# Patient Record
Sex: FEMALE | Race: BLACK OR AFRICAN AMERICAN | Employment: FULL TIME | URBAN - METROPOLITAN AREA
[De-identification: names, ages, dates, MRNs, and addresses within clinical notes are randomized per-mention and may not be internally consistent; named-entity substitution may affect disease eponyms.]

---

## 2019-10-10 ENCOUNTER — OFFICE VISIT (OUTPATIENT)
Dept: PRIMARY CARE CLINIC | Age: 54
End: 2019-10-10

## 2019-10-10 ENCOUNTER — HOSPITAL ENCOUNTER (OUTPATIENT)
Dept: GENERAL RADIOLOGY | Age: 54
Discharge: HOME | End: 2019-10-10
Payer: COMMERCIAL

## 2019-10-10 ENCOUNTER — HOSPITAL ENCOUNTER (OUTPATIENT)
Dept: MAMMOGRAPHY | Age: 54
Discharge: HOME | End: 2019-10-10
Payer: COMMERCIAL

## 2019-10-10 VITALS
RESPIRATION RATE: 16 BRPM | SYSTOLIC BLOOD PRESSURE: 124 MMHG | HEIGHT: 63 IN | TEMPERATURE: 98.2 F | HEART RATE: 74 BPM | WEIGHT: 176 LBS | BODY MASS INDEX: 31.18 KG/M2 | OXYGEN SATURATION: 100 % | DIASTOLIC BLOOD PRESSURE: 82 MMHG

## 2019-10-10 DIAGNOSIS — R06.09 DOE (DYSPNEA ON EXERTION): ICD-10-CM

## 2019-10-10 DIAGNOSIS — M25.561 CHRONIC PAIN OF RIGHT KNEE: ICD-10-CM

## 2019-10-10 DIAGNOSIS — R25.1 TREMORS OF NERVOUS SYSTEM: ICD-10-CM

## 2019-10-10 DIAGNOSIS — G89.29 CHRONIC PAIN OF RIGHT KNEE: ICD-10-CM

## 2019-10-10 DIAGNOSIS — Z00.00 ANNUAL PHYSICAL EXAM: Primary | ICD-10-CM

## 2019-10-10 DIAGNOSIS — Z12.39 BREAST CANCER SCREENING: ICD-10-CM

## 2019-10-10 DIAGNOSIS — Z82.49 FAMILY HISTORY OF DVT: ICD-10-CM

## 2019-10-10 DIAGNOSIS — Z86.711 HISTORY OF PULMONARY EMBOLISM: ICD-10-CM

## 2019-10-10 PROCEDURE — 73562 X-RAY EXAM OF KNEE 3: CPT

## 2019-10-10 PROCEDURE — 77063 BREAST TOMOSYNTHESIS BI: CPT

## 2019-10-10 NOTE — PROGRESS NOTES
Chief Complaint   Patient presents with   [facility].    Other     discomfort,onset 6 mos ,     Tremors     right hand, onset 4 wks    Knee Pain     right , onset 4 mos     1. Have you been to the ER, urgent care clinic since your last visit? Hospitalized since your last visit? No    2. Have you seen or consulted any other health care providers outside of the [address] [place] [address] since your last visit? Include any pap smears or colon screening.  No

## 2019-10-10 NOTE — PROGRESS NOTES
Vince Mathews is a 48 y.o.  female and presents with     Chief Complaint   Patient presents with   [facility]    Other     discomfort,onset 6 mos ,     Tremors     right hand, onset 4 wks, denies any pain just discomfort    Knee Pain     right hand, onset over 6 mos    Referral Request     may need referral for Ortho     Pt is here to establish care,  Pt has pulling sensation behind rt  Knee for over 6 months. No injuries or falls, NO h/o gout. Pt works at [employer]. She also noticed tremors in rt hand . NO neck problems  The rt hand feels weak. NO h/o MS in the family. Pt denies eye symptoms. Pt also gives h/o RUBY and occasional burning sensation in chest. She wants to make sure it is not her heart. Father had stents placed in his heart. Brothers and aunts have h/o DVT. Pt gives h/o PE 4 years back and took Xarelto for 8 months and it was stopped as she has having menstrual bleeding. Pt says she was seieing Hematologist wo did blood work and told everything was okay. However pt did not have  A repeat CT chest to document resolution of PE. Pt does not smoke or drink alcohol. Past Medical History:   Diagnosis Date    Bronchitis      Past Surgical History:   Procedure Laterality Date    HX DILATION AND CURETTAGE  2011    HX HEENT      WISDOM TEETH     No current outpatient medications on file. No current facility-administered medications for this visit. Health Maintenance   Topic Date Due    DTaP/Tdap/Td series (1 - Tdap) 12/07/1988    PAP AKA CERVICAL CYTOLOGY  12/07/1988    Shingrix Vaccine Age 50> (1 of 2) 12/07/2017    BREAST CANCER SCRN MAMMOGRAM  12/07/2017    FOBT Q 1 YEAR AGE 50-75  12/07/2017    Influenza Age 5 to Adult  08/01/2019    Pneumococcal 0-64 years  Aged Out       There is no immunization history on file for this patient. Patient's last menstrual period was 11/20/2013.         Allergies and Intolerances:   No Known Allergies    Family History:   Family History   Problem Relation Age of Onset    Hypertension Mother     Elevated Lipids Mother     Diabetes Mother     Hypertension Father    [facility] Elevated Lipids Father     Anesth Problems Neg Hx        Social History:   She  reports that she has never smoked. She has never used smokeless tobacco.  She  reports that she drinks alcohol. Review of Systems: pos for SOB, pos for tremors, pos for rt knee pain  General: negative for - chills, fatigue, fever, weight change  Psych: negative for - anxiety, depression, irritability or mood swings  ENT: negative for - headaches, hearing change, nasal congestion, oral lesions, sneezing or sore throat  Heme/ Lymph: negative for - bleeding problems, bruising, pallor or swollen lymph nodes  Endo: negative for - hot flashes, polydipsia/polyuria or temperature intolerance  Resp: negative for - cough, shortness of breath or wheezing  CV: negative for - chest pain, edema or palpitations  GI: negative for - abdominal pain, change in bowel habits, constipation, diarrhea or nausea/vomiting  : negative for - dysuria, hematuria, incontinence, pelvic pain or vulvar/vaginal symptoms  MSK: negative for - joint pain, joint swelling or muscle pain  Neuro: negative for - confusion, headaches, seizures or weakness  Derm: negative for - dry skin, hair changes, rash or skin lesion changes          Physical:   Vitals:   Vitals:    10/10/19 1301   BP: 124/82   Pulse: 74   Resp: 16   Temp: 98.2 °F (36.8 °C)   TempSrc: Oral   SpO2: 100%   Weight: 176 lb (79.8 kg)   Height: 5' 3\" (1.6 m)           Exam:   HEENT- atraumatic,normocephalic, awake, oriented, well nourished  Neck - supple,no enlarged lymph nodes, no JVD, no thyromegaly  Chest- CTA, no rhonchi, no crackles  Heart- rrr, no murmurs / gallop/rub  Abdomen- soft,BS+,NT, no hepatosplenomegaly  Ext - no c/c/edema , mild tremors in rt hand on extension  Neuro- no focal deficits. Power 5/5 all extremities  Skin - warm,dry, no obvious kaylynn. Review of Data:   LABS:   Lab Results   Component Value Date/Time    WBC 7.5 11/15/2013 11:14 AM    HGB 12.9 11/15/2013 11:14 AM    HCT 39.0 11/15/2013 11:14 AM    PLATELET 008 65/61/6171 11:14 AM     Lab Results   Component Value Date/Time    Sodium 139 11/15/2013 11:14 AM    Potassium 4.6 11/15/2013 11:14 AM    Chloride 108 11/15/2013 11:14 AM    CO2 26 11/15/2013 11:14 AM    Glucose 75 11/15/2013 11:14 AM    BUN 9 11/15/2013 11:14 AM    Creatinine 1.10 11/15/2013 11:14 AM     No results found for: CHOL, CHOLX, CHLST, CHOLV, HDL, HDLP, LDL, LDLC, DLDLP, TGLX, TRIGL, TRIGP  No results found for: GPT        Impression / Plan:        ICD-10-CM ICD-9-CM    1. Annual physical exam Z00.00 V70.0 LIPID PANEL      AMB POC EKG ROUTINE W/ 12 LEADS, INTER & REP   2. History of pulmonary embolism Z86.711 V12.55 D DIMER   3. Chronic pain of right knee M25.561 719.46 REFERRAL TO ORTHOPEDICS    G89.29 338.29 URIC ACID      XR KNEE RT MAX 2 VWS   4. Tremors of nervous system R25.1 781.0 REFERRAL TO NEUROLOGY      CBC WITH AUTOMATED DIFF      METABOLIC PANEL, COMPREHENSIVE      TSH 3RD GENERATION   5. Breast cancer screening Z12.39 V76.10 KENNETH 3D BHAVNA W MAMMO BI SCREENING INCL CAD   6. Family history of DVT Z82.49 V17.49 D DIMER   7. RUBY (dyspnea on exertion) R06.09 786.09 D DIMER           EKG - no sinus rhythm, no ST - T changes, no pathological Q  Waves. Explained to patient risk benefits of the medications. Advised patient to stop meds if having any side effects. Pt verbalized understanding of the instructions. I have discussed the diagnosis with the patient and the intended plan as seen in the above orders. The patient has received an after-visit summary and questions were answered concerning future plans. I have discussed medication side effects and warnings with the patient as well. I have reviewed the plan of care with the patient, accepted their input and they are in agreement with the treatment goals. Reviewed plan of care. Patient has provided input and agrees with goals.         Michela Marcus MD

## 2019-10-11 LAB
ALBUMIN SERPL-MCNC: 4 G/DL (ref 3.5–5.5)
ALBUMIN/GLOB SERPL: 1.4 {RATIO} (ref 1.2–2.2)
ALP SERPL-CCNC: 98 IU/L (ref 39–117)
ALT SERPL-CCNC: 13 IU/L (ref 0–32)
AST SERPL-CCNC: 14 IU/L (ref 0–40)
BASOPHILS # BLD AUTO: 0 X10E3/UL (ref 0–0.2)
BASOPHILS NFR BLD AUTO: 1 %
BILIRUB SERPL-MCNC: <0.2 MG/DL (ref 0–1.2)
BUN SERPL-MCNC: 13 MG/DL (ref 6–24)
BUN/CREAT SERPL: 10 (ref 9–23)
CALCIUM SERPL-MCNC: 9.2 MG/DL (ref 8.7–10.2)
CHLORIDE SERPL-SCNC: 104 MMOL/L (ref 96–106)
CHOLEST SERPL-MCNC: 206 MG/DL (ref 100–199)
CO2 SERPL-SCNC: 27 MMOL/L (ref 20–29)
CREAT SERPL-MCNC: 1.35 MG/DL (ref 0.57–1)
D DIMER PPP FEU-MCNC: <0.2 MG/L FEU (ref 0–0.49)
EOSINOPHIL # BLD AUTO: 0.1 X10E3/UL (ref 0–0.4)
EOSINOPHIL NFR BLD AUTO: 2 %
ERYTHROCYTE [DISTWIDTH] IN BLOOD BY AUTOMATED COUNT: 13.5 % (ref 12.3–15.4)
GLOBULIN SER CALC-MCNC: 2.9 G/DL (ref 1.5–4.5)
GLUCOSE SERPL-MCNC: 85 MG/DL (ref 65–99)
HCT VFR BLD AUTO: 39.5 % (ref 34–46.6)
HDLC SERPL-MCNC: 58 MG/DL
HGB BLD-MCNC: 13.1 G/DL (ref 11.1–15.9)
IMM GRANULOCYTES # BLD AUTO: 0 X10E3/UL (ref 0–0.1)
IMM GRANULOCYTES NFR BLD AUTO: 0 %
LDLC SERPL CALC-MCNC: 126 MG/DL (ref 0–99)
LYMPHOCYTES # BLD AUTO: 1.4 X10E3/UL (ref 0.7–3.1)
LYMPHOCYTES NFR BLD AUTO: 25 %
MCH RBC QN AUTO: 28.5 PG (ref 26.6–33)
MCHC RBC AUTO-ENTMCNC: 33.2 G/DL (ref 31.5–35.7)
MCV RBC AUTO: 86 FL (ref 79–97)
MONOCYTES # BLD AUTO: 0.4 X10E3/UL (ref 0.1–0.9)
MONOCYTES NFR BLD AUTO: 7 %
NEUTROPHILS # BLD AUTO: 3.7 X10E3/UL (ref 1.4–7)
NEUTROPHILS NFR BLD AUTO: 65 %
PLATELET # BLD AUTO: 221 X10E3/UL (ref 150–450)
POTASSIUM SERPL-SCNC: 4 MMOL/L (ref 3.5–5.2)
PROT SERPL-MCNC: 6.9 G/DL (ref 6–8.5)
RBC # BLD AUTO: 4.59 X10E6/UL (ref 3.77–5.28)
SODIUM SERPL-SCNC: 144 MMOL/L (ref 134–144)
TRIGL SERPL-MCNC: 111 MG/DL (ref 0–149)
TSH SERPL DL<=0.005 MIU/L-ACNC: 0.53 UIU/ML (ref 0.45–4.5)
URATE SERPL-MCNC: 5.9 MG/DL (ref 2.5–7.1)
VLDLC SERPL CALC-MCNC: 22 MG/DL (ref 5–40)
WBC # BLD AUTO: 5.6 X10E3/UL (ref 3.4–10.8)

## 2019-11-07 ENCOUNTER — OFFICE VISIT (OUTPATIENT)
Dept: PRIMARY CARE CLINIC | Age: 54
End: 2019-11-07

## 2019-11-07 VITALS
TEMPERATURE: 98.8 F | HEART RATE: 76 BPM | DIASTOLIC BLOOD PRESSURE: 80 MMHG | BODY MASS INDEX: 30.97 KG/M2 | HEIGHT: 63 IN | RESPIRATION RATE: 16 BRPM | SYSTOLIC BLOOD PRESSURE: 116 MMHG | OXYGEN SATURATION: 98 % | WEIGHT: 174.8 LBS

## 2019-11-07 DIAGNOSIS — R05.9 COUGH: ICD-10-CM

## 2019-11-07 DIAGNOSIS — N28.9 RENAL INSUFFICIENCY: ICD-10-CM

## 2019-11-07 DIAGNOSIS — R25.1 TREMORS OF NERVOUS SYSTEM: Primary | ICD-10-CM

## 2019-11-07 DIAGNOSIS — E04.2 MULTINODULAR GOITER: ICD-10-CM

## 2019-11-07 DIAGNOSIS — E78.00 HYPERCHOLESTEREMIA: ICD-10-CM

## 2019-11-07 RX ORDER — BENZONATATE 100 MG/1
100 CAPSULE ORAL
Qty: 30 CAP | Refills: 3 | Status: SHIPPED | OUTPATIENT
Start: 2019-11-07 | End: 2022-08-15

## 2019-11-07 NOTE — PROGRESS NOTES
Minh Tse is a 48 y.o.  female and presents with     Chief Complaint   Patient presents with    Other     follow up knee pain andtremso    Cough     onset less than week, dry cough, no congestion      Pt has dry cough, post nasal drip. Still has some tremors in rt upper ext . Has appt with Neurology in December  Pt has pulling sensation behind rt knee. Labs reval mild elevated chol and renal insuff. Pt denies kidney stones in the past.      Past Medical History:   Diagnosis Date    Bronchitis      Past Surgical History:   Procedure Laterality Date    HX DILATION AND CURETTAGE  2011    HX HEENT      WISDOM TEETH     Current Outpatient Medications   Medication Sig    benzonatate (TESSALON PERLES) 100 mg capsule Take 1 Cap by mouth three (3) times daily as needed for Cough. No current facility-administered medications for this visit. Health Maintenance   Topic Date Due    DTaP/Tdap/Td series (1 - Tdap) 12/07/1988    PAP AKA CERVICAL CYTOLOGY  12/07/1988    Shingrix Vaccine Age 50> (1 of 2) 12/07/2017    FOBT Q 1 YEAR AGE 50-75  12/07/2017    Influenza Age 5 to Adult  08/01/2019    BREAST CANCER SCRN MAMMOGRAM  10/10/2021    Pneumococcal 0-64 years  Aged Out       There is no immunization history on file for this patient. Patient's last menstrual period was 11/20/2013. Allergies and Intolerances:   No Known Allergies    Family History:   Family History   Problem Relation Age of Onset    Hypertension Mother     Elevated Lipids Mother     Diabetes Mother     Hypertension Father    [facility] Elevated Lipids Father     Anesth Problems Neg Hx        Social History:   She  reports that she has never smoked. She has never used smokeless tobacco.  She  reports that she drinks alcohol.             Review of Systems:   General: negative for - chills, fatigue, fever, weight change  Psych: negative for - anxiety, depression, irritability or mood swings  ENT: negative for - headaches, hearing change, nasal congestion, oral lesions, sneezing or sore throat  Heme/ Lymph: negative for - bleeding problems, bruising, pallor or swollen lymph nodes  Endo: negative for - hot flashes, polydipsia/polyuria or temperature intolerance  Resp: negative for - cough, shortness of breath or wheezing  CV: negative for - chest pain, edema or palpitations  GI: negative for - abdominal pain, change in bowel habits, constipation, diarrhea or nausea/vomiting  : negative for - dysuria, hematuria, incontinence, pelvic pain or vulvar/vaginal symptoms  MSK: negative for - joint pain, joint swelling or muscle pain  Neuro: negative for - confusion, headaches, seizures or weakness  Derm: negative for - dry skin, hair changes, rash or skin lesion changes          Physical:   Vitals:   Vitals:    11/07/19 0831   BP: 116/80   Pulse: 76   Resp: 16   Temp: 98.8 °F (37.1 °C)   TempSrc: Oral   SpO2: 98%   Weight: 174 lb 12.8 oz (79.3 kg)   Height: 5' 3\" (1.6 m)           Exam:   HEENT- atraumatic,normocephalic, awake, oriented, well nourished  Neck - supple,no enlarged lymph nodes, no JVD, no thyromegaly  Chest- CTA, no rhonchi, no crackles  Heart- rrr, no murmurs / gallop/rub  Abdomen- soft,BS+,NT, no hepatosplenomegaly  Ext - no c/c/edema , mild rt upper ext tremors  Neuro- no focal deficits. Power 5/5 all extremities  Skin - warm,dry, no obvious rashes.           Review of Data:   LABS:   Lab Results   Component Value Date/Time    WBC 5.6 10/10/2019 01:56 PM    HGB 13.1 10/10/2019 01:56 PM    HCT 39.5 10/10/2019 01:56 PM    PLATELET 310 20/84/2705 01:56 PM     Lab Results   Component Value Date/Time    Sodium 144 10/10/2019 01:56 PM    Potassium 4.0 10/10/2019 01:56 PM    Chloride 104 10/10/2019 01:56 PM    CO2 27 10/10/2019 01:56 PM    Glucose 85 10/10/2019 01:56 PM    BUN 13 10/10/2019 01:56 PM    Creatinine 1.35 (H) 10/10/2019 01:56 PM     Lab Results   Component Value Date/Time    Cholesterol, total 206 (H) 10/10/2019 01:56 PM    HDL Cholesterol 58 10/10/2019 01:56 PM    LDL, calculated 126 (H) 10/10/2019 01:56 PM    Triglyceride 111 10/10/2019 01:56 PM     No results found for: GPT        Impression / Plan:        ICD-10-CM ICD-9-CM    1. Tremors of nervous system R25.1 781.0    2. Renal insufficiency N28.9 593.9 US RETROPERITONEUM COMP      URINALYSIS W/ RFLX MICROSCOPIC   3. Multinodular goiter E04.2 241.1    4. Cough R05 786.2 benzonatate (TESSALON PERLES) 100 mg capsule   5. Hypercholesteremia E78.00 272.0        Tremors - keep appt with Neurology    Pulling sensation behind rt knee - may need to see Ortho    Avid NSAIDS    Explained to patient risk benefits of the medications. Advised patient to stop meds if having any side effects. Pt verbalized understanding of the instructions. I have discussed the diagnosis with the patient and the intended plan as seen in the above orders. The patient has received an after-visit summary and questions were answered concerning future plans. I have discussed medication side effects and warnings with the patient as well. I have reviewed the plan of care with the patient, accepted their input and they are in agreement with the treatment goals. Reviewed plan of care. Patient has provided input and agrees with goals. Follow-up and Dispositions    · Return if symptoms worsen or fail to improve.          Nyasia Renee MD

## 2019-11-07 NOTE — PROGRESS NOTES
Chief Complaint   Patient presents with    Other     follow up knee pain andtremso    Cough     1. Have you been to the ER, urgent care clinic since your last visit? Hospitalized since your last visit? No    2. Have you seen or consulted any other health care providers outside of the [address] [place] [address] since your last visit? Include any pap smears or colon screening.  No

## 2019-11-08 LAB
APPEARANCE UR: CLEAR
BILIRUB UR QL STRIP: NEGATIVE
COLOR UR: YELLOW
GLUCOSE UR QL: NEGATIVE
HGB UR QL STRIP: NEGATIVE
KETONES UR QL STRIP: NEGATIVE
LEUKOCYTE ESTERASE UR QL STRIP: NEGATIVE
MICRO URNS: NORMAL
NITRITE UR QL STRIP: NEGATIVE
PH UR STRIP: 5.5 [PH] (ref 5–7.5)
PROT UR QL STRIP: NORMAL
SP GR UR: 1.03 (ref 1–1.03)
UROBILINOGEN UR STRIP-MCNC: 0.2 MG/DL (ref 0.2–1)

## 2019-11-15 ENCOUNTER — HOSPITAL ENCOUNTER (OUTPATIENT)
Dept: ULTRASOUND IMAGING | Age: 54
Discharge: HOME | End: 2019-11-15
Attending: INTERNAL MEDICINE
Payer: COMMERCIAL

## 2019-11-15 DIAGNOSIS — N28.9 RENAL INSUFFICIENCY: ICD-10-CM

## 2019-11-15 PROCEDURE — 76770 US EXAM ABDO BACK WALL COMP: CPT

## 2019-12-17 ENCOUNTER — OFFICE VISIT (OUTPATIENT)
Dept: NEUROLOGY | Age: 54
End: 2019-12-17

## 2019-12-17 VITALS
OXYGEN SATURATION: 100 % | TEMPERATURE: 97.9 F | HEART RATE: 70 BPM | DIASTOLIC BLOOD PRESSURE: 84 MMHG | SYSTOLIC BLOOD PRESSURE: 123 MMHG | WEIGHT: 178 LBS | RESPIRATION RATE: 16 BRPM | BODY MASS INDEX: 31.54 KG/M2 | HEIGHT: 63 IN

## 2019-12-17 DIAGNOSIS — R25.9 ABNORMAL INVOLUNTARY MOVEMENTS: Primary | ICD-10-CM

## 2019-12-17 NOTE — PROGRESS NOTES
Chief Complaint   Patient presents with    Tremors     right arm/ hand a few month         HISTORY OF PRESENT ILLNESS  Waqar Mohamud is a 48 y.o. female who came in for neurological consultation requested by Dr. Aureliano Redding. About 5 or 6 months ago she started noticing tremor in her hands and it comes on when she tries to do something with her dominant right hand or hold something in it. She feels as if there is vibration that start slowly and then revs up if she continues with the activity. 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 No problems with handwriting. Denies any tremors at rest.  Sometimes she feels a pulling sensation in her right leg but no tremors in the lower extremities. No weakness or sensory symptoms. No changes in vision, dysphagia, dysarthria, sleep issues, altered smell or balance problem. Past Medical History:   Diagnosis Date    Bronchitis      Current Outpatient Medications   Medication Sig    benzonatate (TESSALON PERLES) 100 mg capsule Take 1 Cap by mouth three (3) times daily as needed for Cough. No current facility-administered medications for this visit.       No Known Allergies  Family History   Problem Relation Age of Onset    Hypertension Mother     Elevated Lipids Mother     Diabetes Mother     Hypertension Father    [de-identified] Elevated Lipids Father     Anesth Problems Neg Hx      Social History     Tobacco Use    Smoking status: Never Smoker    Smokeless tobacco: Never Used   Substance Use Topics    Alcohol use: Yes     Comment: SOCIALLY    Drug use: Never     Past Surgical History:   Procedure Laterality Date    HX DILATION AND CURETTAGE  2011    HX HEENT      WISDOM TEETH         REVIEW OF SYSTEMS  Review of Systems - History obtained from the patient  Psychological ROS: negative  ENT ROS: negative  Hematological and Lymphatic ROS: negative  Endocrine ROS: negative  Respiratory ROS: no cough, shortness of breath, or wheezing  Cardiovascular ROS: no chest pain or dyspnea on exertion  Gastrointestinal ROS: no abdominal pain, change in bowel habits, or black or bloody stools  Genito-Urinary ROS: no dysuria, trouble voiding, or hematuria  Musculoskeletal ROS: negative  Dermatological ROS: negative      PHYSICAL EXAMINATION:    Visit Vitals  /84   Pulse 70   Temp 97.9 °F (36.6 °C) (Oral)   Resp 16   Ht 5' 3\" (1.6 m)   Wt 80.7 kg (178 lb)   SpO2 100%   BMI 31.53 kg/m²     General:  Well nourished, and groomed individual in no acute distress. Neck: Supple, nontender, thyroid within normal limits, no JVD, no bruits, no pain with resistance to active range of motion. Heart: Normal S1S2. Lungs:  Equal chest expansion, no cough, no wheeze  Musculoskeletal:  Extremities revealed no edema and had full range of motion of joints. Psych:  Good mood and bright affect    NEUROLOGICAL EXAMINATION:     Mental Status:   Alert and oriented to person, place, and time with recent and remote memory intact. Attention span and concentration are normal. Speech is fluent   Cranial Nerves:    II, III, IV, VI:  Visual acuity grossly intact. Visual fields are normal.    Pupils are equal, round, and reactive to light and accommodation. Extra-ocular movements are full and fluid. Fundoscopic exam was benign, no ptosis or nystagmus. V-XII: Hearing is grossly intact. Facial features are symmetric, with normal sensation and strength. The palate rises symmetrically and the tongue protrudes midline. Sternocleidomastoids 5/5. Motor Examination: Normal tone, bulk, and strength. 5/5 muscle strength throughout. No cogwheel rigidity or clonus present. Sensory exam:  Normal throughout to pinprick, temperature, and vibration sense. Normal proprioception. Coordination:  Heel-to-shin was smooth and symmetrical bilaterally.   Finger to nose and rapid arm movement testing was normal.  Fine, low amplitude, high-frequency tremor was noted when she was holding a cup in her hand. It starts gradually and then gets up to a frequency of about 10 to 12 Hz. No resting tremor    Gait and Station:  Steady while walking on toes, heels, and with tandem walking. Normal arm swing. No Rhomberg or pronator drift. No muscle wasting or fasiculations noted. Reflexes:  DTRs 2+ throughout. Toes downgoing. LABS / IMAGING  Lab Results   Component Value Date/Time    WBC 5.6 10/10/2019 01:56 PM    HGB 13.1 10/10/2019 01:56 PM    HCT 39.5 10/10/2019 01:56 PM    PLATELET 575 95/62/2884 01:56 PM    MCV 86 10/10/2019 01:56 PM     Lab Results   Component Value Date/Time    Sodium 144 10/10/2019 01:56 PM    Potassium 4.0 10/10/2019 01:56 PM    Chloride 104 10/10/2019 01:56 PM    CO2 27 10/10/2019 01:56 PM    Anion gap 5 11/15/2013 11:14 AM    Glucose 85 10/10/2019 01:56 PM    BUN 13 10/10/2019 01:56 PM    Creatinine 1.35 (H) 10/10/2019 01:56 PM    BUN/Creatinine ratio 10 10/10/2019 01:56 PM    GFR est AA 52 (L) 10/10/2019 01:56 PM    GFR est non-AA 45 (L) 10/10/2019 01:56 PM    Calcium 9.2 10/10/2019 01:56 PM    Bilirubin, total <0.2 10/10/2019 01:56 PM    AST (SGOT) 14 10/10/2019 01:56 PM    Alk. phosphatase 98 10/10/2019 01:56 PM    Protein, total 6.9 10/10/2019 01:56 PM    Albumin 4.0 10/10/2019 01:56 PM    A-G Ratio 1.4 10/10/2019 01:56 PM    ALT (SGPT) 13 10/10/2019 01:56 PM     Lab Results   Component Value Date/Time    TSH 0.530 10/10/2019 01:56 PM       ASSESSMENT    ICD-10-CM ICD-9-CM    1. Abnormal involuntary movements R25.9 781.0 MRI BRAIN W WO CONT       DISCUSSION  Ms. Caleb Jacobs had a rather abrupt onset of unilateral, low amplitude high-frequency tremor in her dominant right hand. The characteristics of the tremor suggest benign tremorand I do not see any parkinsonism.  However given rather abrupt onset, some altered sensory perceptions in the right lower extremity, somewhat delayed onset of tremor with certain tasks I have recommended MRI scan of the brain to rule out a structural cause  If it is negative, will continue to monitor this clinically  She wishes to defer any pharmacologic therapy to suppress tremors at this time    Thank you for allowing me to participate in the care of Ms. Forbes. Please feel free to contact me if you have any questions. I will be happy to follow to follow her along with you. Radha Khalil MD  Diplomate, American Board of Psychiatry & Neurology (Neurology)  [place] Board of Psychiatry & Neurology (Clinical Neurophysiology)  Diplomate, American Board of Electrodiagnostic Medicine  This note will not be viewable in [address]

## 2019-12-17 NOTE — PATIENT INSTRUCTIONS
[facility]   Statement to Patients  April 1, 2014      In an effort to ensure the large volume of patient prescription refills is processed in the most efficient and expeditious manner, we are asking our patients to assist us by calling your Pharmacy for all prescription refills, this will include also your  Mail Order Pharmacy. The pharmacy will contact our office electronically to continue the refill process. Please do not wait until the last minute to call your pharmacy. We need at least 48 hours (2days) to fill prescriptions. We also encourage you to call your pharmacy before going to  your prescription to make sure it is ready. With regard to controlled substance prescription refill requests (narcotic refills) that need to be picked up at our office, we ask your cooperation by providing us with at least 72 hours (3days) notice that you will need a refill. We will not refill narcotic prescription refill requests after 4:00pm on any weekday, Monday through Thursday, or after 2:00pm on Fridays, or on the weekends. We encourage everyone to explore another way of getting your prescription refill request processed using [employer], our patient web portal through our electronic medical record system. [employer]p is an efficient and effective way to communicate your medication request directly to the office and  downloadable as an nacho on your smart phone . [employer] also features a review functionality that allows you to view your medication list as well as leave messages for your physician. Are you ready to get connected? If so please review the attatched instructions or speak to any of our staff to get you set up right away! Thank you so much for your cooperation. Should you have any questions please contact our Practice Administrator.     The Physicians and Staff,  [facility]

## 2020-09-21 ENCOUNTER — HOSPITAL ENCOUNTER (OUTPATIENT)
Dept: GENERAL RADIOLOGY | Age: 55
Discharge: HOME | End: 2020-09-21
Attending: INTERNAL MEDICINE
Payer: COMMERCIAL

## 2020-09-21 ENCOUNTER — OFFICE VISIT (OUTPATIENT)
Dept: PRIMARY CARE CLINIC | Age: 55
End: 2020-09-21
Payer: COMMERCIAL

## 2020-09-21 VITALS
BODY MASS INDEX: 31.15 KG/M2 | DIASTOLIC BLOOD PRESSURE: 74 MMHG | OXYGEN SATURATION: 98 % | SYSTOLIC BLOOD PRESSURE: 109 MMHG | RESPIRATION RATE: 16 BRPM | TEMPERATURE: 98 F | HEART RATE: 77 BPM | WEIGHT: 175.8 LBS | HEIGHT: 63 IN

## 2020-09-21 DIAGNOSIS — H66.90 ACUTE OTITIS MEDIA, UNSPECIFIED OTITIS MEDIA TYPE: ICD-10-CM

## 2020-09-21 DIAGNOSIS — M25.572 ACUTE LEFT ANKLE PAIN: Primary | ICD-10-CM

## 2020-09-21 DIAGNOSIS — M25.572 ACUTE LEFT ANKLE PAIN: ICD-10-CM

## 2020-09-21 PROCEDURE — 99213 OFFICE O/P EST LOW 20 MIN: CPT | Performed by: INTERNAL MEDICINE

## 2020-09-21 PROCEDURE — 73610 X-RAY EXAM OF ANKLE: CPT

## 2020-09-21 RX ORDER — IBUPROFEN 400 MG/1
400 TABLET ORAL
Qty: 30 TAB | Refills: 0 | Status: SHIPPED | OUTPATIENT
Start: 2020-09-21 | End: 2022-08-15

## 2020-09-21 RX ORDER — AMOXICILLIN 500 MG/1
500 CAPSULE ORAL 3 TIMES DAILY
Qty: 30 CAP | Refills: 0 | Status: SHIPPED | OUTPATIENT
Start: 2020-09-21 | End: 2020-10-01

## 2020-09-21 NOTE — PROGRESS NOTES
Chief Complaint   Patient presents with    Ear Pain     right ear pain for 4 days     3 most recent PHQ Screens 9/21/2020   Little interest or pleasure in doing things Not at all   Feeling down, depressed, irritable, or hopeless Not at all   Total Score PHQ 2 0     Abuse Screening Questionnaire 9/21/2020   Do you ever feel afraid of your partner? N   Are you in a relationship with someone who physically or mentally threatens you? N   Is it safe for you to go home? Y     Visit Vitals  /74 (BP 1 Location: Left arm, BP Patient Position: Sitting)   Pulse 77   Temp 98 °F (36.7 °C) (Oral)   Resp 16   Ht 5' 3\" (1.6 m)   Wt 175 lb 12.8 oz (79.7 kg)   SpO2 98%   BMI 31.14 kg/m²     1. Have you been to the ER, urgent care clinic since your last visit? Hospitalized since your last visit?09/14/20 sprained ankle    2. Have you seen or consulted any other health care providers outside of the [address] [place] [address] since your last visit? Include any pap smears or colon screening.  no

## 2020-09-21 NOTE — PROGRESS NOTES
Navi Lee is a 48 y.o.  female and presents with     Chief Complaint   Patient presents with    Ear Pain     right ear pain for 4 days    Ankle Pain     Patient has been having pain to her right ear for past 4 days. Denies any fever or chills or any throat discomfort. She informed that she also sprained her left ankle and was seen in the ER. They did an x-ray of her ankle and did not show any fracture. However she informed that at the time she had a lot of swelling in her ankle so they could have missed. She does want a repeat the x-ray to make sure there is no fracture. Patient is not taking anything for pain. In general she does not like to take medications. Past Medical History:   Diagnosis Date    Bronchitis      Past Surgical History:   Procedure Laterality Date    HX DILATION AND CURETTAGE  2011    HX HEENT      WISDOM TEETH     Current Outpatient Medications   Medication Sig    amoxicillin (AMOXIL) 500 mg capsule Take 1 Cap by mouth three (3) times daily for 10 days.  ibuprofen (MOTRIN) 400 mg tablet Take 1 Tab by mouth every six (6) hours as needed for Pain.  benzonatate (TESSALON PERLES) 100 mg capsule Take 1 Cap by mouth three (3) times daily as needed for Cough. No current facility-administered medications for this visit. Health Maintenance   Topic Date Due    DTaP/Tdap/Td series (1 - Tdap) 12/07/1988    PAP AKA CERVICAL CYTOLOGY  12/07/1988    Shingrix Vaccine Age 50> (1 of 2) 12/07/2017    FOBT Q1Y Age 50-75  12/07/2017    Flu Vaccine (1) 09/01/2020    Breast Cancer Screen Mammogram  10/10/2021    Lipid Screen  10/10/2024    Pneumococcal 0-64 years  Aged Out       There is no immunization history on file for this patient. Patient's last menstrual period was 11/20/2013.         Allergies and Intolerances:   No Known Allergies    Family History:   Family History   Problem Relation Age of Onset    Hypertension Mother     Elevated Lipids Mother    Lacey Diabetes Mother     Hypertension Father    [facility] Elevated Lipids Father     Anesth Problems Neg Hx        Social History:   She  reports that she has never smoked. She has never used smokeless tobacco.  She  reports current alcohol use. Review of Systems:   General: negative for - chills, fatigue, fever, weight change  Psych: negative for - anxiety, depression, irritability or mood swings  ENT: negative for - headaches, hearing change, nasal congestion, oral lesions, sneezing or sore throat  Heme/ Lymph: negative for - bleeding problems, bruising, pallor or swollen lymph nodes  Endo: negative for - hot flashes, polydipsia/polyuria or temperature intolerance  Resp: negative for - cough, shortness of breath or wheezing  CV: negative for - chest pain, edema or palpitations  GI: negative for - abdominal pain, change in bowel habits, constipation, diarrhea or nausea/vomiting  : negative for - dysuria, hematuria, incontinence, pelvic pain or vulvar/vaginal symptoms  MSK: negative for - joint pain, joint swelling or muscle pain  Neuro: negative for - confusion, headaches, seizures or weakness  Derm: negative for - dry skin, hair changes, rash or skin lesion changes          Physical:   Vitals:   Vitals:    09/21/20 0949   BP: 109/74   Pulse: 77   Resp: 16   Temp: 98 °F (36.7 °C)   TempSrc: Oral   SpO2: 98%   Weight: 175 lb 12.8 oz (79.7 kg)   Height: 5' 3\" (1.6 m)           Exam:   HEENT- atraumatic,normocephalic, awake, oriented, well nourished. Right ear drum inflamed, whitish discoloration of the eardrum  Neck - supple,no enlarged lymph nodes, no JVD, no thyromegaly  Chest- CTA, no rhonchi, no crackles  Heart- rrr, no murmurs / gallop/rub  Abdomen- soft,BS+,NT, no hepatosplenomegaly  Ext - no c/c/edema , left ankle mildly swollen, distally there is bluish discoloration on the dorsum of the left foot. Neuro- no focal deficits. Power 5/5 all extremities  Skin - warm,dry, no obvious rashes.           Review of Data:   LABS:   Lab Results   Component Value Date/Time    WBC 5.6 10/10/2019 01:56 PM    HGB 13.1 10/10/2019 01:56 PM    HCT 39.5 10/10/2019 01:56 PM    PLATELET 913 44/43/7029 01:56 PM     Lab Results   Component Value Date/Time    Sodium 144 10/10/2019 01:56 PM    Potassium 4.0 10/10/2019 01:56 PM    Chloride 104 10/10/2019 01:56 PM    CO2 27 10/10/2019 01:56 PM    Glucose 85 10/10/2019 01:56 PM    BUN 13 10/10/2019 01:56 PM    Creatinine 1.35 (H) 10/10/2019 01:56 PM     Lab Results   Component Value Date/Time    Cholesterol, total 206 (H) 10/10/2019 01:56 PM    HDL Cholesterol 58 10/10/2019 01:56 PM    LDL, calculated 126 (H) 10/10/2019 01:56 PM    Triglyceride 111 10/10/2019 01:56 PM     No components found for: GPT        Impression / Plan:        ICD-10-CM ICD-9-CM    1. Acute left ankle pain  M25.572 719.47 XR ANKLE LT MIN 3 V      ibuprofen (MOTRIN) 400 mg tablet   2. Acute otitis media, unspecified otitis media type  H66.90 382.9 amoxicillin (AMOXIL) 500 mg capsule         Explained to patient risk benefits of the medications. Advised patient to stop meds if having any side effects. Pt verbalized understanding of the instructions. I have discussed the diagnosis with the patient and the intended plan as seen in the above orders. The patient has received an after-visit summary and questions were answered concerning future plans. I have discussed medication side effects and warnings with the patient as well. I have reviewed the plan of care with the patient, accepted their input and they are in agreement with the treatment goals. Reviewed plan of care. Patient has provided input and agrees with goals. Follow-up and Dispositions    · Return in about 6 months (around 3/21/2021).          Baljinder Rutledge MD

## 2020-10-28 ENCOUNTER — TRANSCRIBE ORDER (OUTPATIENT)
Dept: GENERAL RADIOLOGY | Age: 55
End: 2020-10-28

## 2020-10-28 ENCOUNTER — HOSPITAL ENCOUNTER (OUTPATIENT)
Dept: MAMMOGRAPHY | Age: 55
Discharge: HOME | End: 2020-10-28
Payer: COMMERCIAL

## 2020-10-28 DIAGNOSIS — Z12.31 VISIT FOR SCREENING MAMMOGRAM: ICD-10-CM

## 2020-10-28 DIAGNOSIS — Z12.31 VISIT FOR SCREENING MAMMOGRAM: Primary | ICD-10-CM

## 2020-10-28 PROCEDURE — 77063 BREAST TOMOSYNTHESIS BI: CPT

## 2021-04-29 ENCOUNTER — OFFICE VISIT (OUTPATIENT)
Dept: PRIMARY CARE CLINIC | Age: 56
End: 2021-04-29
Payer: COMMERCIAL

## 2021-04-29 ENCOUNTER — HOSPITAL ENCOUNTER (OUTPATIENT)
Dept: GENERAL RADIOLOGY | Age: 56
Discharge: HOME | End: 2021-04-29
Payer: COMMERCIAL

## 2021-04-29 VITALS
RESPIRATION RATE: 16 BRPM | TEMPERATURE: 97.5 F | BODY MASS INDEX: 30.79 KG/M2 | HEART RATE: 74 BPM | HEIGHT: 63 IN | SYSTOLIC BLOOD PRESSURE: 101 MMHG | WEIGHT: 173.8 LBS | OXYGEN SATURATION: 97 % | DIASTOLIC BLOOD PRESSURE: 69 MMHG

## 2021-04-29 DIAGNOSIS — E78.00 HYPERCHOLESTEREMIA: ICD-10-CM

## 2021-04-29 DIAGNOSIS — M53.3 CHRONIC RIGHT SI JOINT PAIN: ICD-10-CM

## 2021-04-29 DIAGNOSIS — N28.9 RENAL INSUFFICIENCY: ICD-10-CM

## 2021-04-29 DIAGNOSIS — Z00.00 ANNUAL PHYSICAL EXAM: Primary | ICD-10-CM

## 2021-04-29 DIAGNOSIS — G89.29 CHRONIC RIGHT SI JOINT PAIN: ICD-10-CM

## 2021-04-29 DIAGNOSIS — Z86.711 HISTORY OF PULMONARY EMBOLISM: ICD-10-CM

## 2021-04-29 DIAGNOSIS — E55.9 VITAMIN D DEFICIENCY: ICD-10-CM

## 2021-04-29 DIAGNOSIS — R25.1 TREMORS OF NERVOUS SYSTEM: ICD-10-CM

## 2021-04-29 DIAGNOSIS — R06.09 DOE (DYSPNEA ON EXERTION): ICD-10-CM

## 2021-04-29 PROCEDURE — 99212 OFFICE O/P EST SF 10 MIN: CPT | Performed by: INTERNAL MEDICINE

## 2021-04-29 PROCEDURE — 72200 X-RAY EXAM SI JOINTS: CPT

## 2021-04-29 PROCEDURE — 99396 PREV VISIT EST AGE 40-64: CPT | Performed by: INTERNAL MEDICINE

## 2021-04-29 NOTE — PROGRESS NOTES
Berna Solorio is a 50 y.o.  female and presents with     Chief Complaint   Patient presents with    Complete Physical     fasting.  Tremors    Back Pain    Shortness of Breath     Pt is here for a physical.  Pt has pain behind her rt knee and pulling. Lower back hurts. Pt gets shocks on the rt side of her head and a pulling sensation back of her ear. NO photophobia. Pt does have neck pain. Pt feels rt side is weak. No seizures. NO h/o head injuries. Pt works from home , on her computer. Pt wears glasses for reading. Pt had her vision checked earlier this month. Pt gets tremors in her rt hand. It gets worse when she holds cup in her hand. NO FH for Neurological problems in family  Grandmother has Parkisnons. Tremors do not get worse when she is nervous. Pt drinks once or twice during the week. Pt saw Neurology in 2019 and MRI was orderd , however she di not do it at the time. Pt does get SOB when she walks. Pt had PE 4 years ago. Pt was on blood thinners for 8 months. HOwever it is not clear if pt had repeat CT scan to document resolution of the PE  Pt does not smoke. Was not a passive smoker. Past Medical History:   Diagnosis Date    Bronchitis      Past Surgical History:   Procedure Laterality Date    HX DILATION AND CURETTAGE  2011    HX HEENT      WISDOM TEETH     Current Outpatient Medications   Medication Sig    ibuprofen (MOTRIN) 400 mg tablet Take 1 Tab by mouth every six (6) hours as needed for Pain.  benzonatate (TESSALON PERLES) 100 mg capsule Take 1 Cap by mouth three (3) times daily as needed for Cough. No current facility-administered medications for this visit.       Health Maintenance   Topic Date Due    PAP AKA CERVICAL CYTOLOGY  Never done    Shingrix Vaccine Age 50> (1 of 2) Never done    Colorectal Cancer Screening Combo  Never done    DTaP/Tdap/Td series (1 - Tdap) 04/29/2022 (Originally 12/7/1988)    Breast Cancer Screen Mammogram  10/28/2022    Lipid Screen  10/10/2024    Flu Vaccine  Completed    COVID-19 Vaccine  Completed    Pneumococcal 0-64 years  Aged Out    Hepatitis C Screening  Discontinued     Immunization History   Administered Date(s) Administered    Covid-19, MODERNA, Mrna, Lnp-s, Pf, 100mcg/0.5mL 03/27/2021, 04/24/2021     Patient's last menstrual period was 11/20/2013. Allergies and Intolerances:   No Known Allergies    Family History:   Family History   Problem Relation Age of Onset    Hypertension Mother     Elevated Lipids Mother     Diabetes Mother     Hypertension Father     Elevated Lipids Father     Breast Cancer Paternal Aunt     Breast Cancer Cousin     Anesth Problems Neg Hx        Social History:   She  reports that she has never smoked. She has never used smokeless tobacco.  She  reports current alcohol use.             Review of Systems:   General: negative for - chills, fatigue, fever, weight change  Psych: negative for - anxiety, depression, irritability or mood swings  ENT: negative for - headaches, hearing change, nasal congestion, oral lesions, sneezing or sore throat  Heme/ Lymph: negative for - bleeding problems, bruising, pallor or swollen lymph nodes  Endo: negative for - hot flashes, polydipsia/polyuria or temperature intolerance  Resp: negative for - cough, shortness of breath or wheezing  CV: negative for - chest pain, edema or palpitations  GI: negative for - abdominal pain, change in bowel habits, constipation, diarrhea or nausea/vomiting  : negative for - dysuria, hematuria, incontinence, pelvic pain or vulvar/vaginal symptoms  MSK: negative for - joint pain, joint swelling or muscle pain  Neuro: negative for - confusion, headaches, seizures or weakness  Derm: negative for - dry skin, hair changes, rash or skin lesion changes          Physical:   Vitals:   Vitals:    04/29/21 0815   BP: 101/69   Pulse: 74   Resp: 16   Temp: 97.5 °F (36.4 °C)   TempSrc: Temporal   SpO2: 97%   Weight: 173 lb 12.8 oz (78.8 kg)   Height: 5' 3\" (1.6 m)           Exam:   HEENT- atraumatic,normocephalic, awake, oriented, well nourished,mild pallor  Neck - supple,no enlarged lymph nodes, no JVD, no thyromegaly  Chest- CTA, no rhonchi, no crackles, diminished expiration. Heart- rrr, no murmurs / gallop/rub  Abdomen- soft,BS+,NT, no hepatosplenomegaly  Ext - no c/c/edema , fine tremors in rt upper extremity  Neuro- no focal deficits. Power 5/5 all extremities  Skin - warm,dry, no obvious rashes. Back - tenderness over rt SI joint. Review of Data:   LABS:   Lab Results   Component Value Date/Time    WBC 5.6 10/10/2019 01:56 PM    HGB 13.1 10/10/2019 01:56 PM    HCT 39.5 10/10/2019 01:56 PM    PLATELET 988 69/29/9462 01:56 PM     Lab Results   Component Value Date/Time    Sodium 144 10/10/2019 01:56 PM    Potassium 4.0 10/10/2019 01:56 PM    Chloride 104 10/10/2019 01:56 PM    CO2 27 10/10/2019 01:56 PM    Glucose 85 10/10/2019 01:56 PM    BUN 13 10/10/2019 01:56 PM    Creatinine 1.35 (H) 10/10/2019 01:56 PM     Lab Results   Component Value Date/Time    Cholesterol, total 206 (H) 10/10/2019 01:56 PM    HDL Cholesterol 58 10/10/2019 01:56 PM    LDL, calculated 126 (H) 10/10/2019 01:56 PM    Triglyceride 111 10/10/2019 01:56 PM     No components found for: GPT        Impression / Plan:        ICD-10-CM ICD-9-CM    1. Annual physical exam  Z00.00 V70.0 CBC WITH AUTOMATED DIFF      METABOLIC PANEL, COMPREHENSIVE      LIPID PANEL   2. Renal insufficiency  N28.9 593.9    3. Tremors of nervous system  R25.1 781.0 MRI BRAIN W WO CONT      TSH 3RD GENERATION      T4, FREE   4. Hypercholesteremia  E78.00 272.0    5. Vitamin D deficiency  E55.9 268.9 VITAMIN D, 25 HYDROXY   6. History of pulmonary embolism  Z86.711 V12.55 REFERRAL TO PULMONARY DISEASE   7. RUBY (dyspnea on exertion)  R06.00 786.09 REFERRAL TO PULMONARY DISEASE   8.  Chronic right SI joint pain  M53.3 724.6 XR SI JTS MAX 2 V    G89.29 338.29          Explained to patient risk benefits of the medications. Advised patient to stop meds if having any side effects. Pt verbalized understanding of the instructions. I have discussed the diagnosis with the patient and the intended plan as seen in the above orders. The patient has received an after-visit summary and questions were answered concerning future plans. I have discussed medication side effects and warnings with the patient as well. I have reviewed the plan of care with the patient, accepted their input and they are in agreement with the treatment goals. Reviewed plan of care. Patient has provided input and agrees with goals. Follow-up and Dispositions    · Return in about 6 weeks (around 6/10/2021).          Checo Slade MD

## 2021-04-29 NOTE — PROGRESS NOTES
Chief Complaint   Patient presents with    Complete Physical     fasting.  Tremors     1. Have you been to the ER, urgent care clinic since your last visit? Hospitalized since your last visit? No    2. Have you seen or consulted any other health care providers outside of the [address] [place] [address] since your last visit? Include any pap smears or colon screening.  No     Visit Vitals  /69 (BP 1 Location: Left upper arm, BP Patient Position: Sitting, BP Cuff Size: Adult)   Pulse 74   Temp 97.5 °F (36.4 °C) (Temporal)   Resp 16   Ht 5' 3\" (1.6 m)   Wt 173 lb 12.8 oz (78.8 kg)   LMP 11/20/2013   SpO2 97%   BMI 30.79 kg/m²

## 2021-05-02 NOTE — PROGRESS NOTES
Small osteophyte ins SI joint . Indicates mild arthritis in the sacro iliac jont that could cause pain.

## 2021-05-13 ENCOUNTER — TELEPHONE (OUTPATIENT)
Dept: PRIMARY CARE CLINIC | Age: 56
End: 2021-05-13

## 2021-05-13 NOTE — TELEPHONE ENCOUNTER
----- Message from [facility] sent at 5/13/2021  1:14 PM EDT -----  Regarding: Dr Shania Kellogg Message/Vendor Calls    Caller's first and last name: pt      Reason for call: referral letter/note      Callback required yes/no and why: yes. To confirm      Best contact number(s):[phone]       Details to clarify the request: pt stated that Dr Tennis Closs gave her a referral for a pulmonary  disease specialist, Dr Daisy Rhodes ([facility], [place]) [phone]. They told her she  needs a referral letter in addition to the referral that she has.        [facility]

## 2021-05-21 ENCOUNTER — TELEPHONE (OUTPATIENT)
Dept: PRIMARY CARE CLINIC | Age: 56
End: 2021-05-21

## 2021-05-21 NOTE — TELEPHONE ENCOUNTER
Mri of the brain 5/25, this case is pending with [facility] and dr can call [phone] and follow prompts from pier to pier. If any further questions fuentes direct call back is [phone].

## 2021-05-23 DIAGNOSIS — R25.1 TREMORS OF NERVOUS SYSTEM: Primary | ICD-10-CM

## 2021-05-27 ENCOUNTER — TRANSCRIBE ORDER (OUTPATIENT)
Dept: SCHEDULING | Age: 56
End: 2021-05-27

## 2021-05-27 DIAGNOSIS — I26.99 PULMONARY EMBOLISM (HCC): Primary | ICD-10-CM

## 2021-06-10 ENCOUNTER — HOSPITAL ENCOUNTER (OUTPATIENT)
Dept: CT IMAGING | Age: 56
Discharge: HOME | End: 2021-06-10
Attending: INTERNAL MEDICINE
Payer: COMMERCIAL

## 2021-06-10 DIAGNOSIS — I26.99 PULMONARY EMBOLISM (HCC): ICD-10-CM

## 2021-06-10 PROCEDURE — 71275 CT ANGIOGRAPHY CHEST: CPT

## 2021-06-10 PROCEDURE — 74011000636 HC RX REV CODE- 636: Performed by: INTERNAL MEDICINE

## 2021-06-10 RX ADMIN — IOPAMIDOL 80 ML: 755 INJECTION, SOLUTION INTRAVENOUS at 08:29

## 2021-11-18 ENCOUNTER — HOSPITAL ENCOUNTER (OUTPATIENT)
Dept: MAMMOGRAPHY | Age: 56
Discharge: HOME | End: 2021-11-18
Attending: SPECIALIST
Payer: COMMERCIAL

## 2021-11-18 ENCOUNTER — TRANSCRIBE ORDER (OUTPATIENT)
Dept: REGISTRATION | Age: 56
End: 2021-11-18

## 2021-11-18 DIAGNOSIS — Z12.31 VISIT FOR SCREENING MAMMOGRAM: ICD-10-CM

## 2021-11-18 DIAGNOSIS — Z12.31 VISIT FOR SCREENING MAMMOGRAM: Primary | ICD-10-CM

## 2021-11-18 PROCEDURE — 77063 BREAST TOMOSYNTHESIS BI: CPT

## 2022-04-29 NOTE — PROGRESS NOTES
Chief Complaint   Patient presents with    Tremors     right arm/ hand a few month     Visit Vitals  /84   Pulse 70   Temp 97.9 °F (36.6 °C) (Oral)   Resp 16   Ht 5' 3\" (1.6 m)   Wt 80.7 kg (178 lb)   SpO2 100%   BMI 31.53 kg/m²     Patient states no falls is here due to tremor in right hand/arm that started 3-4 months ago. Head, normocephalic, atraumatic, Face, Face within normal limits, Ears, External ears within normal limits, Nose/Nasopharynx, External nose  normal appearance, nares patent, no nasal discharge, Mouth and Throat, Oral cavity appearance normal, Breath odor normal, Lips, Appearance normal

## 2022-08-05 ENCOUNTER — HOSPITAL ENCOUNTER (OUTPATIENT)
Dept: GENERAL RADIOLOGY | Age: 57
Discharge: HOME | End: 2022-08-05
Attending: INTERNAL MEDICINE
Payer: COMMERCIAL

## 2022-08-05 ENCOUNTER — OFFICE VISIT (OUTPATIENT)
Dept: PRIMARY CARE CLINIC | Age: 57
End: 2022-08-05
Payer: COMMERCIAL

## 2022-08-05 VITALS
SYSTOLIC BLOOD PRESSURE: 123 MMHG | RESPIRATION RATE: 14 BRPM | BODY MASS INDEX: 31.18 KG/M2 | HEART RATE: 70 BPM | DIASTOLIC BLOOD PRESSURE: 80 MMHG | WEIGHT: 176 LBS | OXYGEN SATURATION: 100 % | HEIGHT: 63 IN | TEMPERATURE: 98 F

## 2022-08-05 DIAGNOSIS — E55.9 VITAMIN D DEFICIENCY: ICD-10-CM

## 2022-08-05 DIAGNOSIS — Z00.00 ANNUAL PHYSICAL EXAM: Primary | ICD-10-CM

## 2022-08-05 DIAGNOSIS — M54.2 NECK PAIN: ICD-10-CM

## 2022-08-05 DIAGNOSIS — R25.1 TREMORS OF NERVOUS SYSTEM: ICD-10-CM

## 2022-08-05 DIAGNOSIS — M79.672 LEFT FOOT PAIN: ICD-10-CM

## 2022-08-05 DIAGNOSIS — E53.8 B12 DEFICIENCY: ICD-10-CM

## 2022-08-05 PROCEDURE — 99396 PREV VISIT EST AGE 40-64: CPT | Performed by: INTERNAL MEDICINE

## 2022-08-05 PROCEDURE — 72050 X-RAY EXAM NECK SPINE 4/5VWS: CPT

## 2022-08-05 NOTE — PROGRESS NOTES
Identified pt with two pt identifiers(name and ). Chief Complaint   Patient presents with    Tremors    Physical    Ankle swelling     Left ankle edema +1    Pain (Chronic)     Patient states \"nerve pain\" in foot       Vitals:    22 1128   BP: 123/80   Pulse: 70   Resp: 14   Temp: 98 °F (36.7 °C)   TempSrc: Oral   SpO2: 100%   Weight: 176 lb (79.8 kg)   Height: 5' 2.5\" (1.588 m)   PainSc:   5   PainLoc: Ankle   LMP: 2013      Health Maintenance Due   Topic    DTaP/Tdap/Td series (1 - Tdap)    Cervical cancer screen     Colorectal Cancer Screening Combo     Shingrix Vaccine Age 50> (1 of 2)    COVID-19 Vaccine (3 - Booster for Moderna series)    Depression Screen        Depression Screening:  :     3 most recent PHQ Screens 2022   Little interest or pleasure in doing things Not at all   Feeling down, depressed, irritable, or hopeless Not at all   Total Score PHQ 2 0        Fall Risk Assessment:  :     Fall Risk Assessment, last 12 mths 2022   Able to walk? Yes   Fall in past 12 months? 0   Do you feel unsteady? 0   Are you worried about falling 0       Abuse Screening:  :     Abuse Screening Questionnaire 2022   Do you ever feel afraid of your partner? N   Are you in a relationship with someone who physically or mentally threatens you? N   Is it safe for you to go home? Y        Coordination of Care Questionnaire:  :     1. Have you been to the ER, urgent care clinic since your last visit? Hospitalized since your last visit? No    2. Have you seen or consulted any other health care providers outside of the [address] [place] [address] since your last visit? Include any pap smears or colon screening.    No

## 2022-08-05 NOTE — PROGRESS NOTES
Karon Bear is a 49 y.o.  female and presents with     Chief Complaint   Patient presents with    Tremors    Physical    Ankle swelling     Left ankle edema +1    Pain (Chronic)     Patient states \"nerve pain\" in foot      Pt is here for a physical.  Pt has tremblng in rt hand and had seen Neurology - who ordered MRI brain , pt was clasutrophobic . Left ankle swelling   Pain in foot  Pt feel about 2 years ago and was wearing a boot , had a sprain. Pt has a question of neuropathy  Pt has numbness and tingling in her feet. Parents have DM and younger sister has DM  NO FH for parkinsons  Pt works for [employer]        Past Medical History:   Diagnosis Date    Bronchitis      Past Surgical History:   Procedure Laterality Date    HX DILATION AND CURETTAGE  2011    HX HEENT      WISDOM TEETH     Current Outpatient Medications   Medication Sig    ibuprofen (MOTRIN) 400 mg tablet Take 1 Tab by mouth every six (6) hours as needed for Pain. (Patient not taking: Reported on 8/5/2022)    benzonatate (TESSALON PERLES) 100 mg capsule Take 1 Cap by mouth three (3) times daily as needed for Cough. (Patient not taking: Reported on 8/5/2022)     No current facility-administered medications for this visit.      Health Maintenance   Topic Date Due    DTaP/Tdap/Td series (1 - Tdap) Never done    Cervical cancer screen  Never done    Shingrix Vaccine Age 50> (1 of 2) Never done    COVID-19 Vaccine (3 - Booster for Moderna series) 09/24/2021    Flu Vaccine (1) 09/01/2022    Depression Screen  08/05/2023    A1C test (Diabetic or Prediabetic)  08/05/2023    Breast Cancer Screen Mammogram  11/18/2023    Lipid Screen  08/05/2027    Colorectal Cancer Screening Combo  12/04/2030    Pneumococcal 0-64 years  Aged Out    Hepatitis C Screening  Discontinued     Immunization History   Administered Date(s) Administered    COVID-19, MODERNA BLUE border, Primary or Immunocompromised, (age 18y+), IM, 100 mcg/0.5mL 03/27/2021, 04/24/2021     Patient's last menstrual period was 11/20/2013. Allergies and Intolerances:   No Known Allergies    Family History:   Family History   Problem Relation Age of Onset    Hypertension Mother     Elevated Lipids Mother     Diabetes Mother     Hypertension Father     Elevated Lipids Father     Breast Cancer Paternal Aunt     Breast Cancer Cousin     Anesth Problems Neg Hx        Social History:   She  reports that she has never smoked. She has never used smokeless tobacco.  She  reports current alcohol use.             Review of Systems:   General: negative for - chills, fatigue, fever, weight change  Psych: negative for - anxiety, depression, irritability or mood swings  ENT: negative for - headaches, hearing change, nasal congestion, oral lesions, sneezing or sore throat  Heme/ Lymph: negative for - bleeding problems, bruising, pallor or swollen lymph nodes  Endo: negative for - hot flashes, polydipsia/polyuria or temperature intolerance  Resp: negative for - cough, shortness of breath or wheezing  CV: negative for - chest pain, edema or palpitations  GI: negative for - abdominal pain, change in bowel habits, constipation, diarrhea or nausea/vomiting  : negative for - dysuria, hematuria, incontinence, pelvic pain or vulvar/vaginal symptoms  MSK: negative for - joint pain, joint swelling or muscle pain  Neuro: negative for - confusion, headaches, seizures or weakness  Derm: negative for - dry skin, hair changes, rash or skin lesion changes          Physical:   Vitals:   Vitals:    08/05/22 1128   BP: 123/80   Pulse: 70   Resp: 14   Temp: 98 °F (36.7 °C)   TempSrc: Oral   SpO2: 100%   Weight: 176 lb (79.8 kg)   Height: 5' 2.5\" (1.588 m)           Exam:   HEENT- atraumatic,normocephalic, awake, oriented, well nourished  Neck - supple,no enlarged lymph nodes, no JVD, no thyromegaly  Chest- CTA, no rhonchi, no crackles  Heart- rrr, no murmurs / gallop/rub  Abdomen- soft,BS+,NT, no hepatosplenomegaly  Ext - no c/c/edema Neuro- no focal deficits. Power 5/5 all extremities, slight tremors of rt upper ext  Skin - warm,dry, no obvious rashes. Review of Data:   LABS:   Lab Results   Component Value Date/Time    WBC 5.0 08/05/2022 01:00 PM    HGB 13.6 08/05/2022 01:00 PM    HCT 42.3 08/05/2022 01:00 PM    PLATELET 915 66/98/8966 01:00 PM     Lab Results   Component Value Date/Time    Sodium 143 08/05/2022 01:00 PM    Potassium 4.7 08/05/2022 01:00 PM    Chloride 110 (H) 08/05/2022 01:00 PM    CO2 27 08/05/2022 01:00 PM    Glucose 82 08/05/2022 01:00 PM    BUN 15 08/05/2022 01:00 PM    Creatinine 1.17 (H) 08/05/2022 01:00 PM     Lab Results   Component Value Date/Time    Cholesterol, total 187 08/05/2022 01:00 PM    HDL Cholesterol 58 08/05/2022 01:00 PM    LDL, calculated 110.2 (H) 08/05/2022 01:00 PM    Triglyceride 94 08/05/2022 01:00 PM     No components found for: GPT        Impression / Plan:        ICD-10-CM ICD-9-CM    1. Annual physical exam  Z00.00 V70.0 CBC WITH AUTOMATED DIFF      METABOLIC PANEL, COMPREHENSIVE      LIPID PANEL      HEMOGLOBIN A1C WITH EAG      VITAMIN B12 & FOLATE      TSH 3RD GENERATION      2. B12 deficiency  E53.8 266.2 VITAMIN B12 & FOLATE      3. Tremors of nervous system  R25.1 781.0 REFERRAL TO NEUROLOGY      4. Vitamin D deficiency  E55.9 268.9 VITAMIN D, 25 HYDROXY      5. Neck pain  M54.2 723.1 XR SPINE CERV 4 OR 5 V      6. Left foot pain  M79.672 729.5 REFERRAL TO PODIATRY        Explained to patient risk benefits of the medications. Advised patient to stop meds if having any side effects. Pt verbalized understanding of the instructions. I have discussed the diagnosis with the patient and the intended plan as seen in the above orders. The patient has received an after-visit summary and questions were answered concerning future plans. I have discussed medication side effects and warnings with the patient as well.  I have reviewed the plan of care with the patient, accepted their input and they are in agreement with the treatment goals. Reviewed plan of care. Patient has provided input and agrees with goals. Follow-up and Dispositions    Return in about 3 months (around 11/5/2022).          Lorrie Mccain MD

## 2022-08-15 ENCOUNTER — OFFICE VISIT (OUTPATIENT)
Dept: NEUROLOGY | Age: 57
End: 2022-08-15
Payer: COMMERCIAL

## 2022-08-15 VITALS
HEART RATE: 78 BPM | OXYGEN SATURATION: 97 % | WEIGHT: 176 LBS | RESPIRATION RATE: 18 BRPM | BODY MASS INDEX: 31.68 KG/M2 | DIASTOLIC BLOOD PRESSURE: 78 MMHG | SYSTOLIC BLOOD PRESSURE: 104 MMHG

## 2022-08-15 DIAGNOSIS — G25.2 DYSTONIC TREMOR: Primary | ICD-10-CM

## 2022-08-15 DIAGNOSIS — R25.1 TREMOR OF RIGHT HAND: ICD-10-CM

## 2022-08-15 PROCEDURE — 99214 OFFICE O/P EST MOD 30 MIN: CPT | Performed by: PSYCHIATRY & NEUROLOGY

## 2022-08-15 NOTE — PROGRESS NOTES
Chief Complaint   Patient presents with    Follow-up     Last visit 2019 - Complained of right hand tremor, intermittent. Doesn't interfer with ADL's. HISTORY OF PRESENT ILLNESS  Alexandra Gomez came back for follow-up. She was last seen by me about 2-1/2 years ago for tremor in the right upper extremity. This was suspected to be more of a benign tremor. MRI was recommended at that time but she was unable to complete it because of claustrophobia. Overall her tremor has not changed. She continues to notice it with certain tasks such as writing or if she holds her arms/hands in a certain position. It does not appear to be bothersome or disabling. There are no other new symptoms to report. RECAP  She started noticing tremor in her right hand about 3 years ago, when she tries to do something with her dominant right hand or hold something in it. She feels as if there is vibration that start slowly and then revs up if she continues with the activity. 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 No problems with handwriting. Denies any tremors at rest.  Sometimes she feels a pulling sensation in her right leg but no tremors in the lower extremities. No current outpatient medications on file. No current facility-administered medications for this visit. PHYSICAL EXAMINATION:    Visit Vitals  /78   Pulse 78   Resp 18   Wt 176 lb (79.8 kg)   SpO2 97%   BMI 31.68 kg/m²       NEUROLOGICAL EXAMINATION:     Mental Status:   Alert and oriented to person, place, and time. Attention span and concentration are normal. Speech is fluent . Cranial Nerves:    II, III, IV, VI:  Visual acuity grossly intact. Visual fields are normal.    Pupils are equal, round, and reactive. Extra-ocular movements are full and fluid. V-XII: Hearing is grossly intact. Facial features are symmetric, with normal sensation and strength.   The palate rises symmetrically and the tongue protrudes midline. Motor Examination: Normal tone, bulk, and strength. Sensory exam:  Normal throughout     Coordination:  Finger to nose and rapid arm movement testing was normal.  No resting tremor. Fine tremor of 8 to 9 Hz frequency, with fluctuating amplitude was noted when she tried to write or when her arms were outstretched. Gait and Station:  Steady. Normal arm swing. No muscle wasting or fasiculations noted. LABS / IMAGING  Lab Results   Component Value Date/Time    WBC 5.0 08/05/2022 01:00 PM    HGB 13.6 08/05/2022 01:00 PM    HCT 42.3 08/05/2022 01:00 PM    PLATELET 289 07/19/5452 01:00 PM    MCV 89.4 08/05/2022 01:00 PM     Lab Results   Component Value Date/Time    Sodium 143 08/05/2022 01:00 PM    Potassium 4.7 08/05/2022 01:00 PM    Chloride 110 (H) 08/05/2022 01:00 PM    CO2 27 08/05/2022 01:00 PM    Anion gap 6 08/05/2022 01:00 PM    Glucose 82 08/05/2022 01:00 PM    BUN 15 08/05/2022 01:00 PM    Creatinine 1.17 (H) 08/05/2022 01:00 PM    BUN/Creatinine ratio 13 08/05/2022 01:00 PM    GFR est AA 58 (L) 08/05/2022 01:00 PM    GFR est non-AA 48 (L) 08/05/2022 01:00 PM    Calcium 9.4 08/05/2022 01:00 PM    Bilirubin, total 0.4 08/05/2022 01:00 PM    Alk. 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 Tremor of right hand  R25.1 781.0           DISCUSSION  Ms. Ade Briggs has had tremor in her right upper extremity for the past 3 years.   Fortunately, it has not changed or worsened at all   The tremor now does appear to have characteristics of focal dystonic tremor or a task specific tremor   We discussed the benign nature of this condition and the fact that it may change or worsen over time  It does not appear to be bothersome or disabling to her.   Periodic clinical follow-up is reasonable at this time  She can let me know if it gets worse and we can discuss treatment options    Time 30 minutes  Meena Cui MD  Diplomate, American Board of Psychiatry & Neurology (Neurology)  [place] Board of Psychiatry & Neurology (Clinical Neurophysiology)  Diplomate, American Board of Electrodiagnostic Medicine

## 2022-11-23 ENCOUNTER — HOSPITAL ENCOUNTER (OUTPATIENT)
Dept: MAMMOGRAPHY | Age: 57
Discharge: HOME | End: 2022-11-23
Attending: SPECIALIST
Payer: COMMERCIAL

## 2022-11-23 ENCOUNTER — TRANSCRIBE ORDER (OUTPATIENT)
Dept: MAMMOGRAPHY | Age: 57
End: 2022-11-23

## 2022-11-23 DIAGNOSIS — Z12.31 VISIT FOR SCREENING MAMMOGRAM: Primary | ICD-10-CM

## 2022-11-23 DIAGNOSIS — Z12.31 VISIT FOR SCREENING MAMMOGRAM: ICD-10-CM

## 2022-11-23 PROCEDURE — 77063 BREAST TOMOSYNTHESIS BI: CPT

## 2022-11-28 ENCOUNTER — OFFICE VISIT (OUTPATIENT)
Dept: PRIMARY CARE CLINIC | Age: 57
End: 2022-11-28
Payer: COMMERCIAL

## 2022-11-28 VITALS
HEART RATE: 72 BPM | SYSTOLIC BLOOD PRESSURE: 117 MMHG | DIASTOLIC BLOOD PRESSURE: 77 MMHG | TEMPERATURE: 97.1 F | RESPIRATION RATE: 18 BRPM | WEIGHT: 174.2 LBS | OXYGEN SATURATION: 100 % | HEIGHT: 63 IN | BODY MASS INDEX: 30.87 KG/M2

## 2022-11-28 DIAGNOSIS — R73.03 PREDIABETES: ICD-10-CM

## 2022-11-28 DIAGNOSIS — Z23 ENCOUNTER FOR IMMUNIZATION: Primary | ICD-10-CM

## 2022-11-28 DIAGNOSIS — G25.2 DYSTONIC TREMOR: ICD-10-CM

## 2022-11-28 PROCEDURE — 90686 IIV4 VACC NO PRSV 0.5 ML IM: CPT | Performed by: INTERNAL MEDICINE

## 2022-11-28 PROCEDURE — 90471 IMMUNIZATION ADMIN: CPT | Performed by: INTERNAL MEDICINE

## 2022-11-28 PROCEDURE — 99213 OFFICE O/P EST LOW 20 MIN: CPT | Performed by: INTERNAL MEDICINE

## 2022-11-28 NOTE — PROGRESS NOTES
Saranya Alex is a 49 y.o.  female and presents with     Chief Complaint   Patient presents with    Foot Pain     Follow up on left foot pain     Tremors     3 month follow up      Pt is here for follow up on labs. Both parents are diabetic. Pt saw Neurology for tremors and sicne it is sporadic it was decided to observe for now. Has had tremors very occasionally in the past 2 years. Does not drop objects from her hands. Wants to watch her diet and exercise. Past Medical History:   Diagnosis Date    Bronchitis     Menopause     LMP-Unknown     Past Surgical History:   Procedure Laterality Date    HX DILATION AND CURETTAGE  2011    HX HEENT      WISDOM TEETH     No current outpatient medications on file. No current facility-administered medications for this visit. Health Maintenance   Topic Date Due    DTaP/Tdap/Td series (1 - Tdap) Never done    Cervical cancer screen  Never done    COVID-19 Vaccine (5 - Booster for Moderna series) 12/31/2023 (Originally 7/14/2022)    A1C test (Diabetic or Prediabetic)  08/05/2023    Depression Screen  11/28/2023    Breast Cancer Screen Mammogram  11/23/2024    Lipid Screen  08/05/2027    Colorectal Cancer Screening Combo  12/04/2030    Shingrix Vaccine Age 50>  Completed    Flu Vaccine  Completed    Pneumococcal 0-64 years  Aged Out    Hepatitis C Screening  Discontinued     Immunization History   Administered Date(s) Administered    COVID-19, MODERNA BLUE border, Primary or Immunocompromised, (age 18y+), IM, 100 mcg/0.5mL 03/27/2021, 04/24/2021, 11/18/2021, 05/19/2022    Influenza, FLUARIX, FLULAVAL, FLUZONE (age 10 mo+) AND AFLURIA, (age 1 y+), PF, 0.5mL 11/28/2022    Zoster Recombinant 09/29/2019, 01/25/2020     No LMP recorded (lmp unknown).  Patient is postmenopausal.        Allergies and Intolerances:   No Known Allergies    Family History:   Family History   Problem Relation Age of Onset    Hypertension Mother     Elevated Lipids Mother     Diabetes Mother Breast Cancer Paternal Aunt         2 paternal aunts--ages unknown    Hypertension Father     Elevated Lipids Father     Breast Cancer Cousin         paternal--age unknown    Anesth Problems Neg Hx        Social History:   She  reports that she has never smoked. She has never used smokeless tobacco.  She  reports current alcohol use. Review of Systems:   General: negative for - chills, fatigue, fever, weight change  Psych: negative for - anxiety, depression, irritability or mood swings  ENT: negative for - headaches, hearing change, nasal congestion, oral lesions, sneezing or sore throat  Heme/ Lymph: negative for - bleeding problems, bruising, pallor or swollen lymph nodes  Endo: negative for - hot flashes, polydipsia/polyuria or temperature intolerance  Resp: negative for - cough, shortness of breath or wheezing  CV: negative for - chest pain, edema or palpitations  GI: negative for - abdominal pain, change in bowel habits, constipation, diarrhea or nausea/vomiting  : negative for - dysuria, hematuria, incontinence, pelvic pain or vulvar/vaginal symptoms  MSK: negative for - joint pain, joint swelling or muscle pain  Neuro: negative for - confusion, headaches, seizures or weakness  Derm: negative for - dry skin, hair changes, rash or skin lesion changes          Physical:   Vitals:   Vitals:    11/28/22 0843   BP: 117/77   Pulse: 72   Resp: 18   Temp: 97.1 °F (36.2 °C)   TempSrc: Temporal   SpO2: 100%   Weight: 174 lb 3.2 oz (79 kg)   Height: 5' 2.5\" (1.588 m)           Exam:   HEENT- atraumatic,normocephalic, awake, oriented, well nourished  Neck - supple,no enlarged lymph nodes, no JVD, no thyromegaly  Chest- CTA, no rhonchi, no crackles  Heart- rrr, no murmurs / gallop/rub  Abdomen- soft,BS+,NT, no hepatosplenomegaly  Ext - no c/c/edema , slight tremors in rt upper ext on stretching . Neuro- no focal deficits. Power 5/5 all extremities  Skin - warm,dry, no obvious rashes.           Review of Data:   LABS:   Lab Results   Component Value Date/Time    WBC 5.0 08/05/2022 01:00 PM    HGB 13.6 08/05/2022 01:00 PM    HCT 42.3 08/05/2022 01:00 PM    PLATELET 861 69/94/9502 01:00 PM     Lab Results   Component Value Date/Time    Sodium 143 08/05/2022 01:00 PM    Potassium 4.7 08/05/2022 01:00 PM    Chloride 110 (H) 08/05/2022 01:00 PM    CO2 27 08/05/2022 01:00 PM    Glucose 82 08/05/2022 01:00 PM    BUN 15 08/05/2022 01:00 PM    Creatinine 1.17 (H) 08/05/2022 01:00 PM     Lab Results   Component Value Date/Time    Cholesterol, total 187 08/05/2022 01:00 PM    HDL Cholesterol 58 08/05/2022 01:00 PM    LDL, calculated 110.2 (H) 08/05/2022 01:00 PM    Triglyceride 94 08/05/2022 01:00 PM     No components found for: GPT        Impression / Plan:        ICD-10-CM ICD-9-CM    1. Encounter for immunization  Z23 V03.89 INFLUENZA, FLUARIX, FLULAVAL, FLUZONE (AGE 6 MO+), AFLURIA(AGE 3Y+) IM, PF, 0.5 ML      2. 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 Prediabetes  R73.03 790.29         Advise diet exercise and weight loss. Explained to patient risk benefits of the medications. Advised patient to stop meds if having any side effects. Pt verbalized understanding of the instructions. I have discussed the diagnosis with the patient and the intended plan as seen in the above orders. The patient has received an after-visit summary and questions were answered concerning future plans. I have discussed medication side effects and warnings with the patient as well. I have reviewed the plan of care with the patient, accepted their input and they are in agreement with the treatment goals. Reviewed plan of care. Patient has provided input and agrees with goals. Follow-up and Dispositions    Return in about 1 year (around 11/28/2023) for FULL PHYSICAL - 30 minutes.          Rossy Jaime MD

## 2022-11-28 NOTE — PROGRESS NOTES
Chief Complaint   Patient presents with    Foot Pain     Follow up on left foot pain     Tremors     3 month follow up         Visit Vitals  /77 (BP 1 Location: Left upper arm, BP Patient Position: Sitting)   Pulse 72   Temp 97.1 °F (36.2 °C) (Temporal)   Resp 18   Ht 5' 2.5\" (1.588 m)   Wt 174 lb 3.2 oz (79 kg)   LMP  (LMP Unknown)   SpO2 100%   BMI 31.35 kg/m²        Health Maintenance Due   Topic    DTaP/Tdap/Td series (1 - Tdap)    Cervical cancer screen         1. \"Have you been to the ER, urgent care clinic since your last visit? Hospitalized since your last visit? \" No    2. \"Have you seen or consulted any other health care providers outside of the [address] [place] [address] since your last visit? \" No     3. For patients aged 39-70: Has the patient had a colonoscopy / FIT/ Cologuard? Yes - no Care Gap present      If the patient is female:    4. For patients aged 41-77: Has the patient had a mammogram within the past 2 years? Yes - no Care Gap present      5. For patients aged 21-65: Has the patient had a pap smear?  No   Appointment 12/13/2022 with   Dr. Alonso Gipson

## 2023-11-30 ENCOUNTER — HOSPITAL ENCOUNTER (OUTPATIENT)
Facility: HOSPITAL | Age: 58
Discharge: HOME | End: 2023-11-30
Attending: SPECIALIST
Payer: COMMERCIAL

## 2023-11-30 VITALS — HEIGHT: 61 IN | BODY MASS INDEX: 32.47 KG/M2 | WEIGHT: 172 LBS

## 2023-11-30 DIAGNOSIS — Z12.31 SCREENING MAMMOGRAM FOR HIGH-RISK PATIENT: ICD-10-CM

## 2023-11-30 PROCEDURE — 77063 BREAST TOMOSYNTHESIS BI: CPT

## 2024-05-01 ENCOUNTER — TELEPHONE (OUTPATIENT)
Dept: PRIMARY CARE CLINIC | Facility: CLINIC | Age: 59
End: 2024-05-01

## 2024-05-01 NOTE — TELEPHONE ENCOUNTER
Returned patients call. Patient states she woke up this morning with a more frequent cough and scratchy throat. Her voice is raspy.    Saw pulmonology last year and they said she was okay. She wants to make sure this isnt something new happening how it did last year

## 2024-05-02 ENCOUNTER — HOSPITAL ENCOUNTER (OUTPATIENT)
Facility: HOSPITAL | Age: 59
Discharge: HOME | End: 2024-05-02
Payer: COMMERCIAL

## 2024-05-02 ENCOUNTER — OFFICE VISIT (OUTPATIENT)
Dept: PRIMARY CARE CLINIC | Facility: CLINIC | Age: 59
End: 2024-05-02
Payer: COMMERCIAL

## 2024-05-02 VITALS
SYSTOLIC BLOOD PRESSURE: 101 MMHG | RESPIRATION RATE: 18 BRPM | HEART RATE: 80 BPM | DIASTOLIC BLOOD PRESSURE: 66 MMHG | OXYGEN SATURATION: 97 % | BODY MASS INDEX: 32.32 KG/M2 | WEIGHT: 171.2 LBS | HEIGHT: 61 IN | TEMPERATURE: 98.2 F

## 2024-05-02 DIAGNOSIS — J84.9 ILD (INTERSTITIAL LUNG DISEASE) (HCC): ICD-10-CM

## 2024-05-02 DIAGNOSIS — J30.1 SEASONAL ALLERGIC RHINITIS DUE TO POLLEN: ICD-10-CM

## 2024-05-02 DIAGNOSIS — R05.2 SUBACUTE COUGH: ICD-10-CM

## 2024-05-02 DIAGNOSIS — H65.191 ACUTE MUCOID OTITIS MEDIA OF RIGHT EAR: Primary | ICD-10-CM

## 2024-05-02 DIAGNOSIS — K21.9 GASTROESOPHAGEAL REFLUX DISEASE WITHOUT ESOPHAGITIS: ICD-10-CM

## 2024-05-02 LAB
ERYTHROCYTE [DISTWIDTH] IN BLOOD BY AUTOMATED COUNT: 13.5 % (ref 11.5–14.5)
HCT VFR BLD AUTO: 40.9 % (ref 35–47)
HGB BLD-MCNC: 13.5 G/DL (ref 11.5–16)
MCH RBC QN AUTO: 29.2 PG (ref 26–34)
MCHC RBC AUTO-ENTMCNC: 33 G/DL (ref 30–36.5)
MCV RBC AUTO: 88.5 FL (ref 80–99)
NRBC # BLD: 0 K/UL (ref 0–0.01)
NRBC BLD-RTO: 0 PER 100 WBC
PLATELET # BLD AUTO: 130 K/UL (ref 150–400)
PMV BLD AUTO: 12 FL (ref 8.9–12.9)
RBC # BLD AUTO: 4.62 M/UL (ref 3.8–5.2)
WBC # BLD AUTO: 4.3 K/UL (ref 3.6–11)

## 2024-05-02 PROCEDURE — 99214 OFFICE O/P EST MOD 30 MIN: CPT | Performed by: INTERNAL MEDICINE

## 2024-05-02 PROCEDURE — 71046 X-RAY EXAM CHEST 2 VIEWS: CPT

## 2024-05-02 RX ORDER — AMOXICILLIN AND CLAVULANATE POTASSIUM 875; 125 MG/1; MG/1
1 TABLET, FILM COATED ORAL 2 TIMES DAILY
Qty: 14 TABLET | Refills: 0 | Status: SHIPPED | OUTPATIENT
Start: 2024-05-02 | End: 2024-05-09

## 2024-05-02 RX ORDER — FLUTICASONE PROPIONATE 50 MCG
1 SPRAY, SUSPENSION (ML) NASAL DAILY
Qty: 32 G | Refills: 1 | Status: SHIPPED | OUTPATIENT
Start: 2024-05-02

## 2024-05-02 RX ORDER — BENZONATATE 100 MG/1
100 CAPSULE ORAL 3 TIMES DAILY PRN
Qty: 30 CAPSULE | Refills: 0 | Status: SHIPPED | OUTPATIENT
Start: 2024-05-02 | End: 2024-05-12

## 2024-05-02 RX ORDER — LORATADINE 10 MG/1
10 TABLET ORAL DAILY
Qty: 90 TABLET | Refills: 1 | Status: SHIPPED | OUTPATIENT
Start: 2024-05-02

## 2024-05-02 RX ORDER — OMEPRAZOLE 40 MG/1
40 CAPSULE, DELAYED RELEASE ORAL
Qty: 90 CAPSULE | Refills: 1 | Status: SHIPPED | OUTPATIENT
Start: 2024-05-02

## 2024-05-02 SDOH — ECONOMIC STABILITY: FOOD INSECURITY: WITHIN THE PAST 12 MONTHS, YOU WORRIED THAT YOUR FOOD WOULD RUN OUT BEFORE YOU GOT MONEY TO BUY MORE.: NEVER TRUE

## 2024-05-02 SDOH — ECONOMIC STABILITY: HOUSING INSECURITY
IN THE LAST 12 MONTHS, WAS THERE A TIME WHEN YOU DID NOT HAVE A STEADY PLACE TO SLEEP OR SLEPT IN A SHELTER (INCLUDING NOW)?: NO

## 2024-05-02 SDOH — ECONOMIC STABILITY: FOOD INSECURITY: WITHIN THE PAST 12 MONTHS, THE FOOD YOU BOUGHT JUST DIDN'T LAST AND YOU DIDN'T HAVE MONEY TO GET MORE.: NEVER TRUE

## 2024-05-02 SDOH — ECONOMIC STABILITY: INCOME INSECURITY: HOW HARD IS IT FOR YOU TO PAY FOR THE VERY BASICS LIKE FOOD, HOUSING, MEDICAL CARE, AND HEATING?: NOT HARD AT ALL

## 2024-05-02 ASSESSMENT — PATIENT HEALTH QUESTIONNAIRE - PHQ9
SUM OF ALL RESPONSES TO PHQ QUESTIONS 1-9: 0
2. FEELING DOWN, DEPRESSED OR HOPELESS: NOT AT ALL
SUM OF ALL RESPONSES TO PHQ QUESTIONS 1-9: 0
SUM OF ALL RESPONSES TO PHQ QUESTIONS 1-9: 0
1. LITTLE INTEREST OR PLEASURE IN DOING THINGS: NOT AT ALL
SUM OF ALL RESPONSES TO PHQ QUESTIONS 1-9: 0
SUM OF ALL RESPONSES TO PHQ9 QUESTIONS 1 & 2: 0

## 2024-05-02 NOTE — PROGRESS NOTES
\"Have you been to the ER, urgent care clinic since your last visit?  Hospitalized since your last visit?\"    NO    “Have you seen or consulted any other health care providers outside of [facility] since your last visit?”    NO     “Have you had a pap smear?”        No cervical cancer screening on file         “Have you had a colorectal cancer screening such as a colonoscopy/FIT/Cologuard?        No colonoscopy on file  No cologuard on file  No FIT/FOBT on file   No flexible sigmoidoscopy on file         Click Here for Release of Records Request  
08/05/2022 01:00 PM    HDL 58 08/05/2022 01:00 PM    .2 08/05/2022 01:00 PM     No components found for: \"GPT\"        Assessment & Plan  1. Chest tightness and cough.  The patient's cough is likely attributable to acid reflux and allergies within the nose and throat, which could be the cause of her cough. A chest x-ray and blood tests will be conducted to rule out any underlying issues. Additionally, an allergy medication will be prescribed to alleviate her cough. For her otitis media, Augmentin will be prescribed. The patient is advised to take probiotics to prevent diarrhea. Should the patient develop a yeast infection, she is to inform me, at which point fluconazole will be prescribed.       1. Acute mucoid otitis media of right ear  -     amoxicillin-clavulanate (AUGMENTIN) 875-125 MG per tablet; Take 1 tablet by mouth 2 times daily for 7 days, Disp-14 tablet, R-0Normal  2. Seasonal allergic rhinitis due to pollen  -     loratadine (CLARITIN) 10 MG tablet; Take 1 tablet by mouth daily, Disp-90 tablet, R-1Normal  -     fluticasone (FLONASE) 50 MCG/ACT nasal spray; 1 spray by Each Nostril route daily, Disp-32 g, R-1Normal  3. Gastroesophageal reflux disease without esophagitis  -     omeprazole (PRILOSEC) 40 MG delayed release capsule; Take 1 capsule by mouth every morning (before breakfast), Disp-90 capsule, R-1Normal  4. ILD (interstitial lung disease) ([facility])  -     ALVERTO, Direct, w/Reflex; Future  -     IgE; Future  -     XR CHEST (2 VIEWS); Future  -     CBC; Future  5. Subacute cough  -     benzonatate (TESSALON) 100 MG capsule; Take 1 capsule by mouth 3 times daily as needed for Cough, Disp-30 capsule, R-0Normal         Explained to patient risk benefits of the medications.Advised patient to stop meds if having any side effects.Pt verbalized understanding of the instructions.    I have discussed the diagnosis with the patient and the intended plan as seen in the above orders.  The patient has received an

## 2024-05-03 LAB
ANA SER QL: POSITIVE
CENTROMERE B AB SER-ACNC: <0.2 AI (ref 0–0.9)
CHROMATIN AB SERPL-ACNC: <0.2 AI (ref 0–0.9)
DSDNA AB SER-ACNC: 1 IU/ML (ref 0–9)
ENA JO1 AB SER-ACNC: <0.2 AI (ref 0–0.9)
ENA RNP AB SER-ACNC: 0.3 AI (ref 0–0.9)
ENA SCL70 AB SER-ACNC: <0.2 AI (ref 0–0.9)
ENA SM AB SER-ACNC: <0.2 AI (ref 0–0.9)
ENA SS-A AB SER-ACNC: <0.2 AI (ref 0–0.9)
ENA SS-B AB SER-ACNC: 1 AI (ref 0–0.9)
Lab: ABNORMAL

## 2024-05-05 DIAGNOSIS — D69.6 THROMBOCYTOPENIA (HCC): Primary | ICD-10-CM

## 2024-05-07 LAB — IGE SERPL-ACNC: 81 IU/ML (ref 6–495)

## 2024-07-15 ENCOUNTER — TELEPHONE (OUTPATIENT)
Dept: PRIMARY CARE CLINIC | Facility: CLINIC | Age: 59
End: 2024-07-15

## 2024-07-15 NOTE — TELEPHONE ENCOUNTER
Tried reaching patient.  PCP is out of the office for 2 weeks and this can be addressed when he returns

## 2024-07-15 NOTE — TELEPHONE ENCOUNTER
----- Message from Criselda Granado sent at 7/12/2024 12:14 PM EDT -----  Regarding: [facility] Referral Request  [facility] Referral Request    Reason for referral request: Lab/Test Order    Specialist/Lab/Test patient is requesting (if known): Hearing test    Specialist Phone Number (if applicable):    Additional Information Pt wants to schedule an appt for her hearing test and she doesn't have an order  --------------------------------------------------------------------------------------------------------------------------    Relationship to Patient: Self     Call Back Information: OK to leave message on voicemail  Preferred Call Back Number: Phone [phone]

## 2024-10-22 DIAGNOSIS — K21.9 GASTROESOPHAGEAL REFLUX DISEASE WITHOUT ESOPHAGITIS: ICD-10-CM

## 2024-10-22 RX ORDER — OMEPRAZOLE 40 MG/1
40 CAPSULE, DELAYED RELEASE ORAL
Qty: 90 CAPSULE | Refills: 0 | Status: SHIPPED | OUTPATIENT
Start: 2024-10-22

## 2024-10-22 NOTE — TELEPHONE ENCOUNTER
PCP: Geraldo Ferro MD    Last Visit 5/2/2024   Future Appointments   Date Time Provider Department Center   10/24/2024  8:00 AM Geraldo Ferro MD [facility]   11/29/2024  4:30 PM [facility] 1 [facility] [place] [facility]       Requested Prescriptions     Pending Prescriptions Disp Refills    omeprazole (PRILOSEC) 40 MG delayed release capsule [Pharmacy Med Name: OMEPRAZOLE DR 40 MG CAPSULE] 90 capsule 1     Sig: TAKE 1 CAPSULE BY MOUTH EVERY DAY IN THE MORNING BEFORE BREAKFAST         Other Comments: Last Refill

## 2024-10-24 ENCOUNTER — OFFICE VISIT (OUTPATIENT)
Dept: PRIMARY CARE CLINIC | Facility: CLINIC | Age: 59
End: 2024-10-24
Payer: COMMERCIAL

## 2024-10-24 VITALS
HEART RATE: 72 BPM | RESPIRATION RATE: 18 BRPM | OXYGEN SATURATION: 97 % | HEIGHT: 61 IN | BODY MASS INDEX: 32.28 KG/M2 | DIASTOLIC BLOOD PRESSURE: 60 MMHG | SYSTOLIC BLOOD PRESSURE: 102 MMHG | WEIGHT: 171 LBS

## 2024-10-24 DIAGNOSIS — E55.9 VITAMIN D DEFICIENCY: ICD-10-CM

## 2024-10-24 DIAGNOSIS — J45.990 EXERCISE-INDUCED ASTHMA: ICD-10-CM

## 2024-10-24 DIAGNOSIS — J30.1 SEASONAL ALLERGIC RHINITIS DUE TO POLLEN: ICD-10-CM

## 2024-10-24 DIAGNOSIS — Z00.00 ANNUAL PHYSICAL EXAM: Primary | ICD-10-CM

## 2024-10-24 DIAGNOSIS — Z00.00 ANNUAL PHYSICAL EXAM: ICD-10-CM

## 2024-10-24 DIAGNOSIS — Z12.31 ENCOUNTER FOR SCREENING MAMMOGRAM FOR MALIGNANT NEOPLASM OF BREAST: ICD-10-CM

## 2024-10-24 LAB
25(OH)D3 SERPL-MCNC: 29.8 NG/ML (ref 30–100)
ALBUMIN SERPL-MCNC: 3.8 G/DL (ref 3.5–5)
ALBUMIN/GLOB SERPL: 1.1 (ref 1.1–2.2)
ALP SERPL-CCNC: 90 U/L (ref 45–117)
ALT SERPL-CCNC: 20 U/L (ref 12–78)
ANION GAP SERPL CALC-SCNC: 3 MMOL/L (ref 2–12)
AST SERPL-CCNC: 13 U/L (ref 15–37)
BILIRUB SERPL-MCNC: 0.3 MG/DL (ref 0.2–1)
BUN SERPL-MCNC: 18 MG/DL (ref 6–20)
BUN/CREAT SERPL: 14 (ref 12–20)
CALCIUM SERPL-MCNC: 9.2 MG/DL (ref 8.5–10.1)
CHLORIDE SERPL-SCNC: 111 MMOL/L (ref 97–108)
CHOLEST SERPL-MCNC: 212 MG/DL
CO2 SERPL-SCNC: 29 MMOL/L (ref 21–32)
CREAT SERPL-MCNC: 1.3 MG/DL (ref 0.55–1.02)
ERYTHROCYTE [DISTWIDTH] IN BLOOD BY AUTOMATED COUNT: 13.6 % (ref 11.5–14.5)
EST. AVERAGE GLUCOSE BLD GHB EST-MCNC: 108 MG/DL
GLOBULIN SER CALC-MCNC: 3.4 G/DL (ref 2–4)
GLUCOSE SERPL-MCNC: 92 MG/DL (ref 65–100)
HBA1C MFR BLD: 5.4 % (ref 4–5.6)
HCT VFR BLD AUTO: 40.8 % (ref 35–47)
HDLC SERPL-MCNC: 62 MG/DL
HDLC SERPL: 3.4 (ref 0–5)
HGB BLD-MCNC: 13.2 G/DL (ref 11.5–16)
LDLC SERPL CALC-MCNC: 130.8 MG/DL (ref 0–100)
MCH RBC QN AUTO: 28.7 PG (ref 26–34)
MCHC RBC AUTO-ENTMCNC: 32.4 G/DL (ref 30–36.5)
MCV RBC AUTO: 88.7 FL (ref 80–99)
NRBC # BLD: 0 K/UL (ref 0–0.01)
NRBC BLD-RTO: 0 PER 100 WBC
PLATELET # BLD AUTO: 177 K/UL (ref 150–400)
PMV BLD AUTO: 12.1 FL (ref 8.9–12.9)
POTASSIUM SERPL-SCNC: 4.3 MMOL/L (ref 3.5–5.1)
PROT SERPL-MCNC: 7.2 G/DL (ref 6.4–8.2)
RBC # BLD AUTO: 4.6 M/UL (ref 3.8–5.2)
SODIUM SERPL-SCNC: 143 MMOL/L (ref 136–145)
TRIGL SERPL-MCNC: 96 MG/DL
TSH SERPL DL<=0.05 MIU/L-ACNC: 0.81 UIU/ML (ref 0.36–3.74)
VLDLC SERPL CALC-MCNC: 19.2 MG/DL
WBC # BLD AUTO: 4.9 K/UL (ref 3.6–11)

## 2024-10-24 PROCEDURE — 99396 PREV VISIT EST AGE 40-64: CPT | Performed by: INTERNAL MEDICINE

## 2024-10-24 RX ORDER — ALBUTEROL SULFATE 90 UG/1
2 INHALANT RESPIRATORY (INHALATION) EVERY 6 HOURS PRN
Qty: 18 G | Refills: 3 | Status: SHIPPED | OUTPATIENT
Start: 2024-10-24

## 2024-10-24 RX ORDER — MONTELUKAST SODIUM 10 MG/1
10 TABLET ORAL NIGHTLY
Qty: 90 TABLET | Refills: 1 | Status: SHIPPED | OUTPATIENT
Start: 2024-10-24

## 2024-10-24 ASSESSMENT — PATIENT HEALTH QUESTIONNAIRE - PHQ9
SUM OF ALL RESPONSES TO PHQ QUESTIONS 1-9: 0
SUM OF ALL RESPONSES TO PHQ QUESTIONS 1-9: 0
1. LITTLE INTEREST OR PLEASURE IN DOING THINGS: NOT AT ALL
2. FEELING DOWN, DEPRESSED OR HOPELESS: NOT AT ALL
SUM OF ALL RESPONSES TO PHQ QUESTIONS 1-9: 0
SUM OF ALL RESPONSES TO PHQ9 QUESTIONS 1 & 2: 0
SUM OF ALL RESPONSES TO PHQ QUESTIONS 1-9: 0

## 2024-10-24 NOTE — PROGRESS NOTES
\"Have you been to the ER, urgent care clinic since your last visit?  Hospitalized since your last visit?\"    NO    “Have you seen or consulted any other health care providers outside our system since your last visit?”    NO     “Have you had a pap smear?”    YES - Where: [facility] Pizarro  Nurse/RUFUS to request most recent records if not in the chart    No cervical cancer screening on file       “Have you had a colorectal cancer screening such as a colonoscopy/FIT/Cologuard?    NO [facility] [place]    No colonoscopy on file  No cologuard on file  No FIT/FOBT on file   No flexible sigmoidoscopy on file          
weight change  Psych: negative for - anxiety, depression, irritability or mood swings  ENT: negative for - headaches, hearing change, nasal congestion, oral lesions, sneezing or sore throat  Heme/ Lymph: negative for - bleeding problems, bruising, pallor or swollen lymph nodes  Endo: negative for - hot flashes, polydipsia/polyuria or temperature intolerance  Resp: negative for - cough, shortness of breath or wheezing  CV: negative for - chest pain, edema or palpitations  GI: negative for - abdominal pain, change in bowel habits, constipation, diarrhea or nausea/vomiting  : negative for - dysuria, hematuria, incontinence, pelvic pain or vulvar/vaginal symptoms  MSK: negative for - joint pain, joint swelling or muscle pain  Neuro: negative for - confusion, headaches, seizures or weakness  Derm: negative for - dry skin, hair changes, rash or skin lesion changes      Physical:   Vitals:   Vitals:    10/24/24 0813   BP: 102/60   Pulse: 72   Resp: 18   SpO2: 97%   Weight: 77.6 kg (171 lb)   Height: 1.549 m (5' 1\")       Physical Exam  Nasal passages are very narrow.  Lungs are perfectly clear.  Heart sounds good.       Exam:   HEENT- atraumatic,normocephalic, awake, oriented, well nourished  Neck - supple,no enlarged lymph nodes, no JVD, no thyromegaly  Chest- CTA, no rhonchi, no crackles  Heart- rrr, no murmurs / gallop/rub  Abdomen- soft,BS+,NT, no hepatosplenomegaly  Ext - no c/c/edema   Neuro- no focal deficits.Power 5/5 all extremities  Skin - warm,dry, no obvious rashes.        Results  Laboratory Studies  IgE levels were normal. Connective tissue disease test was negative.    Imaging  Chest X-ray was normal. CT scan of the chest in 2021 showed bilateral interstitial infiltrates. Cervical spine X-ray in 2022 showed mild degenerative disc disease.     Review of Data:   LABS:   Lab Results   Component Value Date/Time    WBC 4.9 10/24/2024 09:23 AM    HGB 13.2 10/24/2024 09:23 AM    HCT 40.8 10/24/2024 09:23 AM

## 2024-11-29 ENCOUNTER — HOSPITAL ENCOUNTER (OUTPATIENT)
Facility: HOSPITAL | Age: 59
Discharge: HOME | End: 2024-12-02
Payer: COMMERCIAL

## 2024-11-29 DIAGNOSIS — Z12.31 ENCOUNTER FOR SCREENING MAMMOGRAM FOR MALIGNANT NEOPLASM OF BREAST: ICD-10-CM

## 2024-11-29 PROCEDURE — 77067 SCR MAMMO BI INCL CAD: CPT

## 2024-12-20 ENCOUNTER — TELEPHONE (OUTPATIENT)
Dept: PRIMARY CARE CLINIC | Facility: CLINIC | Age: 59
End: 2024-12-20

## 2024-12-20 NOTE — TELEPHONE ENCOUNTER
Pt called because she went to [facility] on   12/12/24 for an ear infection. She was diagnosed with an ear infection and the doctor there had prescribed her some medication. When she picked up the medication she said that it is not covered by her insurance and is asking may Dr. Ferro prescribe her the generic version of it. I let the pt know that I'm not sure if that's even possible for the doctor to do but that I will send a message back and ask

## 2025-04-17 ENCOUNTER — TELEPHONE (OUTPATIENT)
Dept: PRIMARY CARE CLINIC | Facility: CLINIC | Age: 60
End: 2025-04-17

## 2025-04-17 NOTE — TELEPHONE ENCOUNTER
Appointment Request From: Zora Hawkins     With Provider: Dr. Geraldo Ferro MD [[facility]]     Preferred Date Range: 4/17/2025 - 4/18/2025     Preferred Times: Thursday Morning, Thursday Afternoon, Friday Afternoon     Reason for visit: Request an Appointment     Health Maintenance Topic:      Comments:  Hip pain

## 2025-04-18 SDOH — ECONOMIC STABILITY: TRANSPORTATION INSECURITY
IN THE PAST 12 MONTHS, HAS THE LACK OF TRANSPORTATION KEPT YOU FROM MEDICAL APPOINTMENTS OR FROM GETTING MEDICATIONS?: NO

## 2025-04-18 SDOH — ECONOMIC STABILITY: FOOD INSECURITY: WITHIN THE PAST 12 MONTHS, THE FOOD YOU BOUGHT JUST DIDN'T LAST AND YOU DIDN'T HAVE MONEY TO GET MORE.: NEVER TRUE

## 2025-04-18 SDOH — ECONOMIC STABILITY: INCOME INSECURITY: IN THE LAST 12 MONTHS, WAS THERE A TIME WHEN YOU WERE NOT ABLE TO PAY THE MORTGAGE OR RENT ON TIME?: NO

## 2025-04-18 SDOH — ECONOMIC STABILITY: FOOD INSECURITY: WITHIN THE PAST 12 MONTHS, YOU WORRIED THAT YOUR FOOD WOULD RUN OUT BEFORE YOU GOT MONEY TO BUY MORE.: NEVER TRUE

## 2025-04-18 SDOH — ECONOMIC STABILITY: TRANSPORTATION INSECURITY
IN THE PAST 12 MONTHS, HAS LACK OF TRANSPORTATION KEPT YOU FROM MEETINGS, WORK, OR FROM GETTING THINGS NEEDED FOR DAILY LIVING?: NO

## 2025-04-21 ENCOUNTER — OFFICE VISIT (OUTPATIENT)
Dept: PRIMARY CARE CLINIC | Facility: CLINIC | Age: 60
End: 2025-04-21
Payer: COMMERCIAL

## 2025-04-21 VITALS
HEART RATE: 69 BPM | TEMPERATURE: 97.5 F | WEIGHT: 176 LBS | HEIGHT: 61 IN | SYSTOLIC BLOOD PRESSURE: 110 MMHG | OXYGEN SATURATION: 99 % | DIASTOLIC BLOOD PRESSURE: 76 MMHG | BODY MASS INDEX: 33.23 KG/M2 | RESPIRATION RATE: 14 BRPM

## 2025-04-21 DIAGNOSIS — M70.62 TROCHANTERIC BURSITIS OF LEFT HIP: Primary | ICD-10-CM

## 2025-04-21 PROCEDURE — 99213 OFFICE O/P EST LOW 20 MIN: CPT | Performed by: INTERNAL MEDICINE

## 2025-04-21 RX ORDER — IBUPROFEN 600 MG/1
600 TABLET, FILM COATED ORAL 3 TIMES DAILY PRN
Qty: 30 TABLET | Refills: 2 | Status: SHIPPED | OUTPATIENT
Start: 2025-04-21

## 2025-04-21 ASSESSMENT — PATIENT HEALTH QUESTIONNAIRE - PHQ9
SUM OF ALL RESPONSES TO PHQ QUESTIONS 1-9: 0
SUM OF ALL RESPONSES TO PHQ QUESTIONS 1-9: 0
1. LITTLE INTEREST OR PLEASURE IN DOING THINGS: NOT AT ALL
2. FEELING DOWN, DEPRESSED OR HOPELESS: NOT AT ALL
SUM OF ALL RESPONSES TO PHQ QUESTIONS 1-9: 0
SUM OF ALL RESPONSES TO PHQ QUESTIONS 1-9: 0

## 2025-04-21 NOTE — PROGRESS NOTES
\"Have you been to the ER, urgent care clinic since your last visit?  Hospitalized since your last visit?\"    YES - When: approximately 5 months ago.  Where and Why: ear infection.    “Have you seen or consulted any other health care providers outside our system since your last visit?”    YES - When: approximately 5 months ago.  Where and Why: [facility].     “Have you had a pap smear?”    YES - Where: 2024 Dr. Amanda Leon   No cervical cancer screening on file       “Have you had a colorectal cancer screening such as a colonoscopy/FIT/Cologuard?    YES - Type: Colonoscopy - Where: [place]   No colonoscopy on file  No cologuard on file  No FIT/FOBT on file   No flexible sigmoidoscopy on file          
Value Date/Time    CHOL 212 10/24/2024 09:23 AM    HDL 62 10/24/2024 09:23 AM    .8 10/24/2024 09:23 AM    .2 08/05/2022 01:00 PM     No components found for: \"GPT\"        Assessment & Plan  1. Trochanteric bursitis.  - Left hip pain when lying on the left side, present for 2 months.  - Tenderness over the bursa, normal hip joint findings.  - Explained condition with anatomical pictures.  - Prescribed ibuprofen 600 mg TID with food for 5 days; consider referral for cortisone injection if no improvement.    2. Cholesterol management.  - Slightly low cholesterol levels.  - Reviewed blood test results, no immediate action required.  - Continued monitoring.    3. Fluctuating creatinine levels.  - Fluctuating creatinine levels.  - Normal recent ultrasound.  - Limit ibuprofen use to 5 days to avoid potential kidney issues.       1. Trochanteric bursitis of left hip  -     ibuprofen (ADVIL;MOTRIN) 600 MG tablet; Take 1 tablet by mouth 3 times daily as needed for Pain, Disp-30 tablet, R-2Normal       Explained to patient risk benefits of the medications.Advised patient to stop meds if having any side effects.Pt verbalized understanding of the instructions.    I have discussed the diagnosis with the patient and the intended plan as seen in the above orders.  The patient has received an after-visit summary and questions were answered concerning future plans.  I have discussed medication side effects and warnings with the patient as well. I have reviewed the plan of care with the patient, accepted their input and they are in agreement with the treatment goals.     Reviewed plan of care. Patient has provided input and agrees with goals.    No follow-up provider specified.    Geraldo Ferro MD

## 2025-08-04 ENCOUNTER — TELEPHONE (OUTPATIENT)
Dept: PRIMARY CARE CLINIC | Facility: CLINIC | Age: 60
End: 2025-08-04

## 2025-08-06 ENCOUNTER — OFFICE VISIT (OUTPATIENT)
Dept: PRIMARY CARE CLINIC | Facility: CLINIC | Age: 60
End: 2025-08-06
Payer: COMMERCIAL

## 2025-08-06 ENCOUNTER — HOSPITAL ENCOUNTER (OUTPATIENT)
Facility: HOSPITAL | Age: 60
Discharge: HOME | End: 2025-08-09
Payer: COMMERCIAL

## 2025-08-06 ENCOUNTER — RESULTS FOLLOW-UP (OUTPATIENT)
Dept: PRIMARY CARE CLINIC | Facility: CLINIC | Age: 60
End: 2025-08-06

## 2025-08-06 VITALS
OXYGEN SATURATION: 98 % | BODY MASS INDEX: 32.93 KG/M2 | RESPIRATION RATE: 18 BRPM | HEART RATE: 80 BPM | SYSTOLIC BLOOD PRESSURE: 108 MMHG | HEIGHT: 61 IN | DIASTOLIC BLOOD PRESSURE: 62 MMHG | WEIGHT: 174.4 LBS

## 2025-08-06 DIAGNOSIS — J45.990 EXERCISE-INDUCED ASTHMA: ICD-10-CM

## 2025-08-06 DIAGNOSIS — G89.29 CHRONIC MIDLINE LOW BACK PAIN, UNSPECIFIED WHETHER SCIATICA PRESENT: ICD-10-CM

## 2025-08-06 DIAGNOSIS — M70.61 TROCHANTERIC BURSITIS OF BOTH HIPS: ICD-10-CM

## 2025-08-06 DIAGNOSIS — M54.50 CHRONIC MIDLINE LOW BACK PAIN, UNSPECIFIED WHETHER SCIATICA PRESENT: ICD-10-CM

## 2025-08-06 DIAGNOSIS — M25.50 ARTHRALGIA, UNSPECIFIED JOINT: ICD-10-CM

## 2025-08-06 DIAGNOSIS — Z12.11 COLON CANCER SCREENING: ICD-10-CM

## 2025-08-06 DIAGNOSIS — N18.9 CHRONIC KIDNEY DISEASE, UNSPECIFIED CKD STAGE: ICD-10-CM

## 2025-08-06 DIAGNOSIS — J84.9 ILD (INTERSTITIAL LUNG DISEASE) (HCC): ICD-10-CM

## 2025-08-06 DIAGNOSIS — M70.62 TROCHANTERIC BURSITIS OF BOTH HIPS: ICD-10-CM

## 2025-08-06 DIAGNOSIS — M71.21 SYNOVIAL CYST OF RIGHT POPLITEAL SPACE: Primary | ICD-10-CM

## 2025-08-06 LAB
ANION GAP SERPL CALC-SCNC: 10 MMOL/L (ref 2–14)
BUN SERPL-MCNC: 19 MG/DL (ref 6–20)
BUN/CREAT SERPL: 16 (ref 12–20)
CALCIUM SERPL-MCNC: 9.3 MG/DL (ref 8.6–10)
CHLORIDE SERPL-SCNC: 107 MMOL/L (ref 98–107)
CO2 SERPL-SCNC: 27 MMOL/L (ref 20–29)
CREAT SERPL-MCNC: 1.16 MG/DL (ref 0.6–1)
CRP SERPL-MCNC: <0.3 MG/DL (ref 0–0.5)
ERYTHROCYTE [SEDIMENTATION RATE] IN BLOOD: 7 MM/HR (ref 0–30)
GLUCOSE SERPL-MCNC: 86 MG/DL (ref 65–100)
POTASSIUM SERPL-SCNC: 4.8 MMOL/L (ref 3.5–5.1)
SODIUM SERPL-SCNC: 143 MMOL/L (ref 136–145)

## 2025-08-06 PROCEDURE — 72100 X-RAY EXAM L-S SPINE 2/3 VWS: CPT

## 2025-08-06 PROCEDURE — 99214 OFFICE O/P EST MOD 30 MIN: CPT | Performed by: INTERNAL MEDICINE

## 2025-08-06 RX ORDER — ALBUTEROL SULFATE 90 UG/1
2 INHALANT RESPIRATORY (INHALATION) EVERY 6 HOURS PRN
Qty: 18 G | Refills: 3 | Status: SHIPPED | OUTPATIENT
Start: 2025-08-06

## 2025-08-06 ASSESSMENT — PATIENT HEALTH QUESTIONNAIRE - PHQ9
2. FEELING DOWN, DEPRESSED OR HOPELESS: NOT AT ALL
SUM OF ALL RESPONSES TO PHQ QUESTIONS 1-9: 0
SUM OF ALL RESPONSES TO PHQ QUESTIONS 1-9: 0
1. LITTLE INTEREST OR PLEASURE IN DOING THINGS: NOT AT ALL
SUM OF ALL RESPONSES TO PHQ QUESTIONS 1-9: 0
SUM OF ALL RESPONSES TO PHQ QUESTIONS 1-9: 0

## 2025-08-07 DIAGNOSIS — R76.8 POSITIVE ANA (ANTINUCLEAR ANTIBODY): Primary | ICD-10-CM

## 2025-08-07 LAB
ANA SER QL: POSITIVE
CENTROMERE B AB SER-ACNC: <0.2 AI (ref 0–0.9)
CHROMATIN AB SERPL-ACNC: <0.2 AI (ref 0–0.9)
DSDNA AB SER-ACNC: 2 IU/ML (ref 0–9)
ENA JO1 AB SER-ACNC: <0.2 AI (ref 0–0.9)
ENA RNP AB SER-ACNC: 0.3 AI (ref 0–0.9)
ENA SCL70 AB SER-ACNC: <0.2 AI (ref 0–0.9)
ENA SM AB SER-ACNC: <0.2 AI (ref 0–0.9)
ENA SS-A AB SER-ACNC: 0.2 AI (ref 0–0.9)
ENA SS-B AB SER-ACNC: 1.2 AI (ref 0–0.9)
Lab: ABNORMAL

## 2025-08-08 LAB — CCP IGA+IGG SERPL IA-ACNC: 5 UNITS (ref 0–19)

## 2025-08-20 ENCOUNTER — OFFICE VISIT (OUTPATIENT)
Age: 60
End: 2025-08-20
Payer: COMMERCIAL

## 2025-08-20 VITALS — WEIGHT: 174 LBS | BODY MASS INDEX: 32.88 KG/M2

## 2025-08-20 DIAGNOSIS — M70.61 TROCHANTERIC BURSITIS, RIGHT HIP: ICD-10-CM

## 2025-08-20 DIAGNOSIS — M70.62 TROCHANTERIC BURSITIS, LEFT HIP: ICD-10-CM

## 2025-08-20 DIAGNOSIS — M25.551 BILATERAL HIP PAIN: Primary | ICD-10-CM

## 2025-08-20 DIAGNOSIS — M25.552 BILATERAL HIP PAIN: Primary | ICD-10-CM

## 2025-08-20 PROCEDURE — 99203 OFFICE O/P NEW LOW 30 MIN: CPT | Performed by: ORTHOPAEDIC SURGERY

## 2025-08-20 ASSESSMENT — PATIENT HEALTH QUESTIONNAIRE - PHQ9
SUM OF ALL RESPONSES TO PHQ QUESTIONS 1-9: 0
SUM OF ALL RESPONSES TO PHQ QUESTIONS 1-9: 0
2. FEELING DOWN, DEPRESSED OR HOPELESS: NOT AT ALL
1. LITTLE INTEREST OR PLEASURE IN DOING THINGS: NOT AT ALL
SUM OF ALL RESPONSES TO PHQ QUESTIONS 1-9: 0
SUM OF ALL RESPONSES TO PHQ QUESTIONS 1-9: 0